# Patient Record
Sex: FEMALE | Race: WHITE | NOT HISPANIC OR LATINO | Employment: OTHER | URBAN - METROPOLITAN AREA
[De-identification: names, ages, dates, MRNs, and addresses within clinical notes are randomized per-mention and may not be internally consistent; named-entity substitution may affect disease eponyms.]

---

## 2023-01-11 ENCOUNTER — OFFICE VISIT (OUTPATIENT)
Dept: MEDICAL GROUP | Facility: PHYSICIAN GROUP | Age: 73
End: 2023-01-11
Payer: MEDICARE

## 2023-01-11 VITALS
OXYGEN SATURATION: 96 % | WEIGHT: 126 LBS | BODY MASS INDEX: 19.78 KG/M2 | TEMPERATURE: 98.5 F | HEART RATE: 70 BPM | HEIGHT: 67 IN | DIASTOLIC BLOOD PRESSURE: 84 MMHG | RESPIRATION RATE: 14 BRPM | SYSTOLIC BLOOD PRESSURE: 126 MMHG

## 2023-01-11 DIAGNOSIS — Z13.1 DIABETES MELLITUS SCREENING: ICD-10-CM

## 2023-01-11 DIAGNOSIS — E55.9 VITAMIN D DEFICIENCY: ICD-10-CM

## 2023-01-11 DIAGNOSIS — E78.5 DYSLIPIDEMIA: ICD-10-CM

## 2023-01-11 DIAGNOSIS — F43.9 STRESS: ICD-10-CM

## 2023-01-11 DIAGNOSIS — Z00.00 HEALTH CARE MAINTENANCE: ICD-10-CM

## 2023-01-11 DIAGNOSIS — H91.93 HEARING DIFFICULTY OF BOTH EARS: ICD-10-CM

## 2023-01-11 DIAGNOSIS — I10 PRIMARY HYPERTENSION: ICD-10-CM

## 2023-01-11 DIAGNOSIS — Z13.29 SCREENING FOR THYROID DISORDER: ICD-10-CM

## 2023-01-11 DIAGNOSIS — Z13.21 ENCOUNTER FOR VITAMIN DEFICIENCY SCREENING: ICD-10-CM

## 2023-01-11 PROCEDURE — 99204 OFFICE O/P NEW MOD 45 MIN: CPT | Performed by: INTERNAL MEDICINE

## 2023-01-11 RX ORDER — AMLODIPINE BESYLATE 5 MG/1
TABLET ORAL
COMMUNITY
End: 2024-03-07 | Stop reason: SDUPTHER

## 2023-01-11 RX ORDER — OLMESARTAN MEDOXOMIL 20 MG/1
20 TABLET ORAL DAILY
COMMUNITY
End: 2024-03-07 | Stop reason: SDUPTHER

## 2023-01-11 SDOH — ECONOMIC STABILITY: HOUSING INSECURITY: IN THE LAST 12 MONTHS, HOW MANY PLACES HAVE YOU LIVED?: 1

## 2023-01-11 SDOH — ECONOMIC STABILITY: HOUSING INSECURITY
IN THE LAST 12 MONTHS, WAS THERE A TIME WHEN YOU DID NOT HAVE A STEADY PLACE TO SLEEP OR SLEPT IN A SHELTER (INCLUDING NOW)?: YES

## 2023-01-11 SDOH — ECONOMIC STABILITY: FOOD INSECURITY: WITHIN THE PAST 12 MONTHS, THE FOOD YOU BOUGHT JUST DIDN'T LAST AND YOU DIDN'T HAVE MONEY TO GET MORE.: NEVER TRUE

## 2023-01-11 SDOH — ECONOMIC STABILITY: TRANSPORTATION INSECURITY
IN THE PAST 12 MONTHS, HAS THE LACK OF TRANSPORTATION KEPT YOU FROM MEDICAL APPOINTMENTS OR FROM GETTING MEDICATIONS?: NO

## 2023-01-11 SDOH — ECONOMIC STABILITY: FOOD INSECURITY: WITHIN THE PAST 12 MONTHS, YOU WORRIED THAT YOUR FOOD WOULD RUN OUT BEFORE YOU GOT MONEY TO BUY MORE.: NEVER TRUE

## 2023-01-11 SDOH — HEALTH STABILITY: MENTAL HEALTH
STRESS IS WHEN SOMEONE FEELS TENSE, NERVOUS, ANXIOUS, OR CAN'T SLEEP AT NIGHT BECAUSE THEIR MIND IS TROUBLED. HOW STRESSED ARE YOU?: TO SOME EXTENT

## 2023-01-11 SDOH — ECONOMIC STABILITY: TRANSPORTATION INSECURITY
IN THE PAST 12 MONTHS, HAS LACK OF RELIABLE TRANSPORTATION KEPT YOU FROM MEDICAL APPOINTMENTS, MEETINGS, WORK OR FROM GETTING THINGS NEEDED FOR DAILY LIVING?: NO

## 2023-01-11 SDOH — ECONOMIC STABILITY: TRANSPORTATION INSECURITY
IN THE PAST 12 MONTHS, HAS LACK OF TRANSPORTATION KEPT YOU FROM MEETINGS, WORK, OR FROM GETTING THINGS NEEDED FOR DAILY LIVING?: NO

## 2023-01-11 SDOH — HEALTH STABILITY: PHYSICAL HEALTH: ON AVERAGE, HOW MANY MINUTES DO YOU ENGAGE IN EXERCISE AT THIS LEVEL?: 30 MIN

## 2023-01-11 SDOH — HEALTH STABILITY: PHYSICAL HEALTH: ON AVERAGE, HOW MANY DAYS PER WEEK DO YOU ENGAGE IN MODERATE TO STRENUOUS EXERCISE (LIKE A BRISK WALK)?: 5 DAYS

## 2023-01-11 SDOH — ECONOMIC STABILITY: INCOME INSECURITY: HOW HARD IS IT FOR YOU TO PAY FOR THE VERY BASICS LIKE FOOD, HOUSING, MEDICAL CARE, AND HEATING?: NOT VERY HARD

## 2023-01-11 SDOH — ECONOMIC STABILITY: INCOME INSECURITY: IN THE LAST 12 MONTHS, WAS THERE A TIME WHEN YOU WERE NOT ABLE TO PAY THE MORTGAGE OR RENT ON TIME?: NO

## 2023-01-11 SDOH — ECONOMIC STABILITY: HOUSING INSECURITY
IN THE LAST 12 MONTHS, WAS THERE A TIME WHEN YOU DID NOT HAVE A STEADY PLACE TO SLEEP OR SLEPT IN A SHELTER (INCLUDING NOW)?: NO

## 2023-01-11 ASSESSMENT — SOCIAL DETERMINANTS OF HEALTH (SDOH)
HOW MANY DRINKS CONTAINING ALCOHOL DO YOU HAVE ON A TYPICAL DAY WHEN YOU ARE DRINKING: 1 OR 2
WITHIN THE PAST 12 MONTHS, YOU WORRIED THAT YOUR FOOD WOULD RUN OUT BEFORE YOU GOT THE MONEY TO BUY MORE: NEVER TRUE
HOW OFTEN DO YOU ATTEND CHURCH OR RELIGIOUS SERVICES?: PATIENT DECLINED
HOW OFTEN DO YOU ATTENT MEETINGS OF THE CLUB OR ORGANIZATION YOU BELONG TO?: PATIENT DECLINED
HOW OFTEN DO YOU ATTEND CHURCH OR RELIGIOUS SERVICES?: PATIENT DECLINED
DO YOU BELONG TO ANY CLUBS OR ORGANIZATIONS SUCH AS CHURCH GROUPS UNIONS, FRATERNAL OR ATHLETIC GROUPS, OR SCHOOL GROUPS?: NO
HOW OFTEN DO YOU GET TOGETHER WITH FRIENDS OR RELATIVES?: ONCE A WEEK
HOW OFTEN DO YOU ATTENT MEETINGS OF THE CLUB OR ORGANIZATION YOU BELONG TO?: PATIENT DECLINED
HOW OFTEN DO YOU HAVE SIX OR MORE DRINKS ON ONE OCCASION: NEVER
HOW HARD IS IT FOR YOU TO PAY FOR THE VERY BASICS LIKE FOOD, HOUSING, MEDICAL CARE, AND HEATING?: NOT VERY HARD
IN A TYPICAL WEEK, HOW MANY TIMES DO YOU TALK ON THE PHONE WITH FAMILY, FRIENDS, OR NEIGHBORS?: MORE THAN THREE TIMES A WEEK
HOW OFTEN DO YOU GET TOGETHER WITH FRIENDS OR RELATIVES?: ONCE A WEEK
DO YOU BELONG TO ANY CLUBS OR ORGANIZATIONS SUCH AS CHURCH GROUPS UNIONS, FRATERNAL OR ATHLETIC GROUPS, OR SCHOOL GROUPS?: NO
IN A TYPICAL WEEK, HOW MANY TIMES DO YOU TALK ON THE PHONE WITH FAMILY, FRIENDS, OR NEIGHBORS?: MORE THAN THREE TIMES A WEEK
HOW OFTEN DO YOU HAVE A DRINK CONTAINING ALCOHOL: 2-4 TIMES A MONTH

## 2023-01-11 ASSESSMENT — LIFESTYLE VARIABLES
SKIP TO QUESTIONS 9-10: 1
AUDIT-C TOTAL SCORE: 2
HOW MANY STANDARD DRINKS CONTAINING ALCOHOL DO YOU HAVE ON A TYPICAL DAY: 1 OR 2
HOW OFTEN DO YOU HAVE A DRINK CONTAINING ALCOHOL: 2-4 TIMES A MONTH
HOW OFTEN DO YOU HAVE SIX OR MORE DRINKS ON ONE OCCASION: NEVER

## 2023-01-11 ASSESSMENT — PATIENT HEALTH QUESTIONNAIRE - PHQ9: CLINICAL INTERPRETATION OF PHQ2 SCORE: 0

## 2023-01-11 NOTE — PROGRESS NOTES
New Patient to establish    Chief Complaint   Patient presents with    Establish Care    Hearing Problem       Subjective:     History of Present Illness: Patient is a 72 y.o. female who is here today to establish primary care    Current Outpatient Medications on File Prior to Visit   Medication Sig Dispense Refill    Ascorbic Acid (VITAMIN C PO) Take  by mouth.      Zinc Acetate, Oral, (ZINC ACETATE PO) Take  by mouth every day.      MAGNESIUM OXIDE PO Take 800 mg by mouth every day.      GINKGO BILOBA EXTRACT PO Take  by mouth.      GLUCOSAMINE-FISH OIL-EPA-DHA PO Take 2,000 mg by mouth every day.      Cholecalciferol (VITAMIN D3 PO) Take  by mouth.      amLODIPine (NORVASC) 5 MG Tab       olmesartan (BENICAR) 20 MG Tab Take 20 mg by mouth every day.       No current facility-administered medications on file prior to visit.     Allergies   Allergen Reactions    Hydrochlorothiazide Rash    Propranolol Unspecified     Pt unsure of reaction     Patient Active Problem List    Diagnosis Date Noted    Hearing difficulty of both ears 01/11/2023    Dyslipidemia 01/11/2023    Vitamin D deficiency 01/11/2023    Hypertension 09/08/2014     No past medical history on file.  No past surgical history on file.  No family history on file.       ROS:  All other systems reviewed and are negative except as stated in the HPI       Physical Exam:     There were no vitals taken for this visit.  General: Normal appearing. No distress.  Pulmonary: Clear to ausculation.  Normal effort.   Cardiovascular: Regular rate and rhythm    Assessment and Plan:     1. Hearing difficulty of both ears  Associated with ringing, patient has a ENT doctor as well, she has hearing aids as well, reported this might be genetic    2. Dyslipidemia  She would like to evaluate her cholesterol panel, she is not on any statin, denied having cardiovascular disease besides high blood pressure, denies smoking or alcohol or substance use  - Lipid Profile; Future    3.  Vitamin D deficiency  - VITAMIN D,25 HYDROXY (DEFICIENCY); Future, she is taking olmesartan 20 mg daily    8. Stress  - Comp Metabolic Panel; Future    9. Primary hypertension  Well-controlled, she is taking Benicar 20 mg daily, as well as amlodipine 5 mg daily, she is checking blood pressures at home with normal range, she would like to discontinue amlodipine, advised to check blood pressure at home and follow-up with me, denied having symptoms related  - Comp Metabolic Panel; Future    -discussion in details on target blood pressure goal, advised monitoring BP closely at home.  Have BP log to present at follow-up visit or send through my chart.   -Advised low-salt diet, healthy dietary option include plenty of vegetable, reduce refine carbohydrates and sugar, regular exercise as tolerated, healthy fat/protein/carbs, also avoid alcohol, no NSAIDs  If symptoms worsen or persist patient can return to clinic for reevaluation.  Red flags and strict emergency room precautions discussed.  Discussed side effects of medication. Patient understand      > Patient also taking BiEST, as well as other hormones including testosterone and DHEA from other office    Follow Up:      Return in about 4 weeks (around 2/8/2023) for labs.    Please note that this dictation was created using voice recognition software. I have made every reasonable attempt to correct obvious errors, but I expect that there are errors of grammar and possibly content that I did not discover before finalizing the note.    Signed by: Jose Garcia M.D.

## 2023-04-04 ENCOUNTER — HOSPITAL ENCOUNTER (OUTPATIENT)
Dept: LAB | Facility: MEDICAL CENTER | Age: 73
End: 2023-04-04
Attending: INTERNAL MEDICINE
Payer: MEDICARE

## 2023-04-04 DIAGNOSIS — E78.5 DYSLIPIDEMIA: ICD-10-CM

## 2023-04-04 DIAGNOSIS — I10 PRIMARY HYPERTENSION: ICD-10-CM

## 2023-04-04 DIAGNOSIS — E55.9 VITAMIN D DEFICIENCY: ICD-10-CM

## 2023-04-04 DIAGNOSIS — F43.9 STRESS: ICD-10-CM

## 2023-04-04 LAB
25(OH)D3 SERPL-MCNC: 57 NG/ML (ref 30–100)
ALBUMIN SERPL BCP-MCNC: 4.6 G/DL (ref 3.2–4.9)
ALBUMIN/GLOB SERPL: 1.8 G/DL
ALP SERPL-CCNC: 76 U/L (ref 30–99)
ALT SERPL-CCNC: 16 U/L (ref 2–50)
ANION GAP SERPL CALC-SCNC: 11 MMOL/L (ref 7–16)
AST SERPL-CCNC: 23 U/L (ref 12–45)
BILIRUB SERPL-MCNC: 0.5 MG/DL (ref 0.1–1.5)
BUN SERPL-MCNC: 18 MG/DL (ref 8–22)
CALCIUM ALBUM COR SERPL-MCNC: 9.2 MG/DL (ref 8.5–10.5)
CALCIUM SERPL-MCNC: 9.7 MG/DL (ref 8.5–10.5)
CHLORIDE SERPL-SCNC: 99 MMOL/L (ref 96–112)
CHOLEST SERPL-MCNC: 177 MG/DL (ref 100–199)
CO2 SERPL-SCNC: 23 MMOL/L (ref 20–33)
CREAT SERPL-MCNC: 0.66 MG/DL (ref 0.5–1.4)
FASTING STATUS PATIENT QL REPORTED: NORMAL
GFR SERPLBLD CREATININE-BSD FMLA CKD-EPI: 93 ML/MIN/1.73 M 2
GLOBULIN SER CALC-MCNC: 2.6 G/DL (ref 1.9–3.5)
GLUCOSE SERPL-MCNC: 93 MG/DL (ref 65–99)
HDLC SERPL-MCNC: 72 MG/DL
LDLC SERPL CALC-MCNC: 95 MG/DL
POTASSIUM SERPL-SCNC: 4.5 MMOL/L (ref 3.6–5.5)
PROT SERPL-MCNC: 7.2 G/DL (ref 6–8.2)
SODIUM SERPL-SCNC: 133 MMOL/L (ref 135–145)
TRIGL SERPL-MCNC: 51 MG/DL (ref 0–149)

## 2023-04-04 PROCEDURE — 80061 LIPID PANEL: CPT

## 2023-04-04 PROCEDURE — 82306 VITAMIN D 25 HYDROXY: CPT

## 2023-04-04 PROCEDURE — 80053 COMPREHEN METABOLIC PANEL: CPT

## 2023-04-04 PROCEDURE — 36415 COLL VENOUS BLD VENIPUNCTURE: CPT

## 2023-04-06 ENCOUNTER — OFFICE VISIT (OUTPATIENT)
Dept: MEDICAL GROUP | Facility: PHYSICIAN GROUP | Age: 73
End: 2023-04-06
Payer: MEDICARE

## 2023-04-06 ENCOUNTER — PHARMACY VISIT (OUTPATIENT)
Dept: PHARMACY | Facility: MEDICAL CENTER | Age: 73
End: 2023-04-06
Payer: COMMERCIAL

## 2023-04-06 VITALS
BODY MASS INDEX: 19.93 KG/M2 | HEIGHT: 67 IN | RESPIRATION RATE: 14 BRPM | OXYGEN SATURATION: 98 % | WEIGHT: 127 LBS | TEMPERATURE: 99 F | HEART RATE: 70 BPM

## 2023-04-06 DIAGNOSIS — I10 PRIMARY HYPERTENSION: ICD-10-CM

## 2023-04-06 DIAGNOSIS — Z78.0 POSTMENOPAUSAL: ICD-10-CM

## 2023-04-06 DIAGNOSIS — M81.0 AGE-RELATED OSTEOPOROSIS WITHOUT CURRENT PATHOLOGICAL FRACTURE: ICD-10-CM

## 2023-04-06 PROCEDURE — 99214 OFFICE O/P EST MOD 30 MIN: CPT | Performed by: INTERNAL MEDICINE

## 2023-04-06 PROCEDURE — RXMED WILLOW AMBULATORY MEDICATION CHARGE: Performed by: INTERNAL MEDICINE

## 2023-04-06 NOTE — PROGRESS NOTES
"Established Patient    Chief Complaint   Patient presents with    Follow-Up       Subjective:     HPI:   Beth presents today with the following.    Patient Active Problem List    Diagnosis Date Noted    Age related osteoporosis 04/06/2023    Hearing difficulty of both ears 01/11/2023    Dyslipidemia 01/11/2023    Vitamin D deficiency 01/11/2023    Hypertension 09/08/2014       Current Outpatient Medications on File Prior to Visit   Medication Sig Dispense Refill    Ascorbic Acid (VITAMIN C PO) Take  by mouth.      Zinc Acetate, Oral, (ZINC ACETATE PO) Take  by mouth every day.      MAGNESIUM OXIDE PO Take 800 mg by mouth every day.      GINKGO BILOBA EXTRACT PO Take  by mouth.      GLUCOSAMINE-FISH OIL-EPA-DHA PO Take 2,000 mg by mouth every day.      Cholecalciferol (VITAMIN D3 PO) Take  by mouth.      amLODIPine (NORVASC) 5 MG Tab       olmesartan (BENICAR) 20 MG Tab Take 20 mg by mouth every day.       No current facility-administered medications on file prior to visit.       Allergies, past medical history, past surgical history, family history, social history reviewed and updated    ROS:  All other systems reviewed and are negative except as stated in the HPI       Physical Exam:     Pulse 70   Temp 37.2 °C (99 °F) (Temporal)   Resp 14   Ht 1.702 m (5' 7\")   Wt 57.6 kg (127 lb)   SpO2 98%   BMI 19.89 kg/m²   General: Normal appearing. No distress.  Pulmonary: Clear to ausculation.  Normal effort.   Cardiovascular: Regular rate and rhythm    Assessment and Plan:     72 y.o. female with the following issues.    1. Age-related osteoporosis without current pathological fracture  Patient had bone scan 2022 with severe osteoporosis, she is trying to do more conservative management with weight lifting, exercise, eating healthier option, vitamin D, calcium and other supplement, she is also taking hormonal therapy with restriction from her other provider, she is not interested in bisphosphonate or other therapy " for osteoporosis, deny having any new fracture, she would like to have another bone scan to compare with the one last year.  Patient educated in detail regarding conservative management as well  - DS-BONE DENSITY STUDY (DEXA); Future    2. Primary hypertension  Patient check blood pressure at home, maybe once a week or so, reported her numbers are in the 160s over 80s to 90s, today blood pressure is in 140s over 80s, her machine number is higher than our blood pressure cuff here in the office.    She is taking amlodipine 5 mg daily, Benicar 20 mg daily, denies symptoms related, she would like to check her numbers more oftenly at home, and follow-up with me regarding blood pressure management, if patient machine is off consistently, continue 24-hour blood pressure monitor and adjust her medication based on that as well    -discussion in details on target blood pressure goal, advised monitoring BP closely at home.  Have BP log to present at follow-up visit or send through my chart.   -Advised low-salt diet, healthy dietary option include plenty of vegetable, reduce refine carbohydrates and sugar, regular exercise as tolerated, healthy fat/protein/carbs, also avoid alcohol, no NSAIDs  If symptoms worsen or persist patient can return to clinic for reevaluation.  Red flags and strict emergency room precautions discussed.  Discussed side effects of medication. Patient understand    3. Postmenopausal  - DS-BONE DENSITY STUDY (DEXA); Future    Please note that this dictation was created using voice recognition software. I have made every reasonable attempt to correct obvious errors, but I expect that there are errors of grammar and possibly content that I did not discover before finalizing the note.    Signed by: Jose Garcia M.D.

## 2023-06-14 ENCOUNTER — PATIENT MESSAGE (OUTPATIENT)
Dept: HEALTH INFORMATION MANAGEMENT | Facility: OTHER | Age: 73
End: 2023-06-14

## 2023-06-14 ENCOUNTER — DOCUMENTATION (OUTPATIENT)
Dept: HEALTH INFORMATION MANAGEMENT | Facility: OTHER | Age: 73
End: 2023-06-14
Payer: MEDICARE

## 2023-08-01 ENCOUNTER — TELEPHONE (OUTPATIENT)
Dept: HEALTH INFORMATION MANAGEMENT | Facility: OTHER | Age: 73
End: 2023-08-01
Payer: MEDICARE

## 2023-10-25 ENCOUNTER — PHARMACY VISIT (OUTPATIENT)
Dept: PHARMACY | Facility: MEDICAL CENTER | Age: 73
End: 2023-10-25
Payer: COMMERCIAL

## 2023-10-25 PROCEDURE — RXMED WILLOW AMBULATORY MEDICATION CHARGE: Performed by: INTERNAL MEDICINE

## 2023-10-25 RX ORDER — ZOSTER VACCINE RECOMBINANT, ADJUVANTED 50 MCG/0.5
KIT INTRAMUSCULAR
Qty: 0.5 ML | Refills: 0 | Status: SHIPPED | OUTPATIENT
Start: 2023-10-25 | End: 2024-03-07

## 2024-03-06 SDOH — ECONOMIC STABILITY: FOOD INSECURITY: WITHIN THE PAST 12 MONTHS, YOU WORRIED THAT YOUR FOOD WOULD RUN OUT BEFORE YOU GOT MONEY TO BUY MORE.: NEVER TRUE

## 2024-03-06 SDOH — HEALTH STABILITY: PHYSICAL HEALTH: ON AVERAGE, HOW MANY DAYS PER WEEK DO YOU ENGAGE IN MODERATE TO STRENUOUS EXERCISE (LIKE A BRISK WALK)?: 3 DAYS

## 2024-03-06 SDOH — ECONOMIC STABILITY: INCOME INSECURITY: IN THE LAST 12 MONTHS, WAS THERE A TIME WHEN YOU WERE NOT ABLE TO PAY THE MORTGAGE OR RENT ON TIME?: NO

## 2024-03-06 SDOH — ECONOMIC STABILITY: INCOME INSECURITY: HOW HARD IS IT FOR YOU TO PAY FOR THE VERY BASICS LIKE FOOD, HOUSING, MEDICAL CARE, AND HEATING?: NOT VERY HARD

## 2024-03-06 SDOH — HEALTH STABILITY: MENTAL HEALTH
STRESS IS WHEN SOMEONE FEELS TENSE, NERVOUS, ANXIOUS, OR CAN'T SLEEP AT NIGHT BECAUSE THEIR MIND IS TROUBLED. HOW STRESSED ARE YOU?: RATHER MUCH

## 2024-03-06 SDOH — ECONOMIC STABILITY: HOUSING INSECURITY: IN THE LAST 12 MONTHS, HOW MANY PLACES HAVE YOU LIVED?: 1

## 2024-03-06 SDOH — HEALTH STABILITY: PHYSICAL HEALTH: ON AVERAGE, HOW MANY MINUTES DO YOU ENGAGE IN EXERCISE AT THIS LEVEL?: 40 MIN

## 2024-03-06 SDOH — ECONOMIC STABILITY: FOOD INSECURITY: WITHIN THE PAST 12 MONTHS, THE FOOD YOU BOUGHT JUST DIDN'T LAST AND YOU DIDN'T HAVE MONEY TO GET MORE.: NEVER TRUE

## 2024-03-06 ASSESSMENT — LIFESTYLE VARIABLES
SKIP TO QUESTIONS 9-10: 1
HOW OFTEN DO YOU HAVE SIX OR MORE DRINKS ON ONE OCCASION: NEVER
AUDIT-C TOTAL SCORE: 2
HOW MANY STANDARD DRINKS CONTAINING ALCOHOL DO YOU HAVE ON A TYPICAL DAY: 1 OR 2
HOW OFTEN DO YOU HAVE A DRINK CONTAINING ALCOHOL: 2-4 TIMES A MONTH

## 2024-03-06 ASSESSMENT — SOCIAL DETERMINANTS OF HEALTH (SDOH)
HOW HARD IS IT FOR YOU TO PAY FOR THE VERY BASICS LIKE FOOD, HOUSING, MEDICAL CARE, AND HEATING?: NOT VERY HARD
HOW MANY DRINKS CONTAINING ALCOHOL DO YOU HAVE ON A TYPICAL DAY WHEN YOU ARE DRINKING: 1 OR 2
HOW OFTEN DO YOU HAVE A DRINK CONTAINING ALCOHOL: 2-4 TIMES A MONTH
IN A TYPICAL WEEK, HOW MANY TIMES DO YOU TALK ON THE PHONE WITH FAMILY, FRIENDS, OR NEIGHBORS?: THREE TIMES A WEEK
WITHIN THE PAST 12 MONTHS, YOU WORRIED THAT YOUR FOOD WOULD RUN OUT BEFORE YOU GOT THE MONEY TO BUY MORE: NEVER TRUE
DO YOU BELONG TO ANY CLUBS OR ORGANIZATIONS SUCH AS CHURCH GROUPS UNIONS, FRATERNAL OR ATHLETIC GROUPS, OR SCHOOL GROUPS?: YES
HOW OFTEN DO YOU ATTEND CHURCH OR RELIGIOUS SERVICES?: NEVER
HOW OFTEN DO YOU HAVE SIX OR MORE DRINKS ON ONE OCCASION: NEVER
HOW OFTEN DO YOU GET TOGETHER WITH FRIENDS OR RELATIVES?: ONCE A WEEK
HOW OFTEN DO YOU GET TOGETHER WITH FRIENDS OR RELATIVES?: ONCE A WEEK
HOW OFTEN DO YOU ATTENT MEETINGS OF THE CLUB OR ORGANIZATION YOU BELONG TO?: 1 TO 4 TIMES PER YEAR
HOW OFTEN DO YOU ATTEND CHURCH OR RELIGIOUS SERVICES?: NEVER
HOW OFTEN DO YOU ATTENT MEETINGS OF THE CLUB OR ORGANIZATION YOU BELONG TO?: 1 TO 4 TIMES PER YEAR
DO YOU BELONG TO ANY CLUBS OR ORGANIZATIONS SUCH AS CHURCH GROUPS UNIONS, FRATERNAL OR ATHLETIC GROUPS, OR SCHOOL GROUPS?: YES
IN A TYPICAL WEEK, HOW MANY TIMES DO YOU TALK ON THE PHONE WITH FAMILY, FRIENDS, OR NEIGHBORS?: THREE TIMES A WEEK

## 2024-03-07 ENCOUNTER — OFFICE VISIT (OUTPATIENT)
Dept: MEDICAL GROUP | Facility: MEDICAL CENTER | Age: 74
End: 2024-03-07
Payer: MEDICARE

## 2024-03-07 VITALS
DIASTOLIC BLOOD PRESSURE: 84 MMHG | HEART RATE: 70 BPM | RESPIRATION RATE: 16 BRPM | BODY MASS INDEX: 19.78 KG/M2 | SYSTOLIC BLOOD PRESSURE: 148 MMHG | HEIGHT: 67 IN | WEIGHT: 126 LBS | OXYGEN SATURATION: 98 % | TEMPERATURE: 98 F

## 2024-03-07 DIAGNOSIS — H93.13 TINNITUS AURIUM, BILATERAL: ICD-10-CM

## 2024-03-07 DIAGNOSIS — Z79.890 HORMONE REPLACEMENT THERAPY (HRT): ICD-10-CM

## 2024-03-07 DIAGNOSIS — I10 PRIMARY HYPERTENSION: ICD-10-CM

## 2024-03-07 DIAGNOSIS — M81.0 AGE-RELATED OSTEOPOROSIS WITHOUT CURRENT PATHOLOGICAL FRACTURE: ICD-10-CM

## 2024-03-07 DIAGNOSIS — Z12.31 ENCOUNTER FOR SCREENING MAMMOGRAM FOR MALIGNANT NEOPLASM OF BREAST: ICD-10-CM

## 2024-03-07 DIAGNOSIS — N81.10 PROLAPSE OF FEMALE BLADDER, ACQUIRED: ICD-10-CM

## 2024-03-07 DIAGNOSIS — Z13.21 ENCOUNTER FOR VITAMIN DEFICIENCY SCREENING: ICD-10-CM

## 2024-03-07 DIAGNOSIS — Z82.49 FAMILY HISTORY OF HEART DISEASE: ICD-10-CM

## 2024-03-07 PROCEDURE — 3077F SYST BP >= 140 MM HG: CPT | Performed by: FAMILY MEDICINE

## 2024-03-07 PROCEDURE — 99214 OFFICE O/P EST MOD 30 MIN: CPT | Performed by: FAMILY MEDICINE

## 2024-03-07 PROCEDURE — 3079F DIAST BP 80-89 MM HG: CPT | Performed by: FAMILY MEDICINE

## 2024-03-07 RX ORDER — AMLODIPINE BESYLATE 5 MG/1
5 TABLET ORAL DAILY
Qty: 90 TABLET | Refills: 3 | Status: SHIPPED | OUTPATIENT
Start: 2024-03-07

## 2024-03-07 RX ORDER — OLMESARTAN MEDOXOMIL 20 MG/1
20 TABLET ORAL DAILY
Qty: 90 TABLET | Refills: 3 | Status: SHIPPED | OUTPATIENT
Start: 2024-03-07

## 2024-03-07 ASSESSMENT — PATIENT HEALTH QUESTIONNAIRE - PHQ9: CLINICAL INTERPRETATION OF PHQ2 SCORE: 0

## 2024-03-07 NOTE — PROGRESS NOTES
"Verbal consent was acquired by the patient to use [employer] ambient listening note generation during this visit     Subjective:     CC: \"establish care, lab work and referrals\"      History of Present Illness  The patient is a 73-year-old female who is here to establish care and manage medications among other things.    She is basically very healthy and feels terrific. She does not check her blood pressure at home because it gives her high blood pressure. Normally, it is in the low 130s over 85. Her doctor has been prescribing her olmesartan and amlodipine. Her toes tend to go a little numb from the tips when she has high blood pressure. It is worse when she does not sleep well, which was last night. She eats healthy. She denies any swelling in her legs.    She has osteoporosis and is working very hard with weights. She requests a referral for a bone density test. She has not used it because she wants to continue working with weights and see if she can improve her density.    She spoke with Violetta about a prolapsed bladder referral and would like to have it checked out. It has been about a year since she had it repaired. It is annoying, and she feels like her bladder is falling out. It is uncomfortable. She denies any leaky urine. It happens when she sneezes or coughs. She can feel it inside. She has not had any imaging for this. She has 2 children, which were vaginal deliveries. She denies any trauma with them. She denies any other surgeries in her pelvic area. She takes Biest, progesterone, and testosterone cream. She denies any hot flashes. Her annual hormone tests show high cortisol, which she has had for 10 years. She is working on that, which may contribute to the high blood pressure and difficulty sleeping at times.    She has severe tinnitus and hearing loss. One is worse than the other. She has hearing aids, but she still has a hard time hearing people. She is in touch with her audiologist fairly " regularly and is going to set up another appointment to see if she can have them adjusted, but that does not address the tinnitus at all.    She has a root canal and needs to see a dentist, but they are so expensive. She requests a CBC test. Her sodium levels are always very low, but her electrolytes are balanced. She had her colonoscopy done. She does not get influenza or pneumonia vaccines every year.   Her mother had a prolapsed bladder. Her father had heart disease.      Current Outpatient Medications   Medication Sig Refill Last Dispense    amLODIPine (NORVASC) 5 MG Tab Take 1 Tablet by mouth every day. 3 Unknown (outside pharmacy)    Ascorbic Acid (VITAMIN C PO) Take  by mouth.  Unknown (patient-reported)    Cholecalciferol (VITAMIN D3 PO) Take  by mouth.  Unknown (patient-reported)    GLUCOSAMINE-FISH OIL-EPA-DHA PO Take 2,000 mg by mouth every day.  Unknown (patient-reported)    MAGNESIUM OXIDE PO Take 800 mg by mouth every day.  Unknown (patient-reported)    olmesartan (BENICAR) 20 MG Tab Take 1 Tablet by mouth every day. 3 Unknown (outside pharmacy)    Zinc Acetate, Oral, (ZINC ACETATE PO) Take  by mouth every day.  Unknown (patient-reported)      Allergies   Allergen Reactions    Hydrochlorothiazide Rash    Propranolol Unspecified     Pt unsure of reaction      History reviewed. No pertinent past medical history.     History reviewed. No pertinent surgical history.     History reviewed. No pertinent family history.     Social History     Socioeconomic History    Marital status:     Highest education level: 12th grade   Tobacco Use    Smoking status: Never    Smokeless tobacco: Never   Vaping Use    Vaping Use: Never used   Substance and Sexual Activity    Alcohol use: Yes     Comment: very little, almost none    Drug use: Never     Social Determinants of Health     Financial Resource Strain: Low Risk  (3/6/2024)    Overall Financial Resource Strain (CARDIA)     Difficulty of Paying Living Expenses:  "Not very hard   Food Insecurity: No Food Insecurity (3/6/2024)    Hunger Vital Sign     Worried About Running Out of Food in the Last Year: Never true     Ran Out of Food in the Last Year: Never true   Transportation Needs: No Transportation Needs (3/6/2024)    PRAPARE - Transportation     Lack of Transportation (Medical): No     Lack of Transportation (Non-Medical): No   Physical Activity: Insufficiently Active (3/6/2024)    Exercise Vital Sign     Days of Exercise per Week: 3 days     Minutes of Exercise per Session: 40 min   Stress: Stress Concern Present (3/6/2024)    [employer] - Occupational Stress Questionnaire     Feeling of Stress : Rather much   Social Connections: Moderately Integrated (3/6/2024)    Social Connection and Isolation Panel [NHANES]     Frequency of Communication with Friends and Family: Three times a week     Frequency of Social Gatherings with Friends and Family: Once a week     Attends [employer] Services: Never     Active Member of Clubs or Organizations: Yes     Attends Club or Organization Meetings: 1 to 4 times per year     Marital Status:    Housing Stability: Low Risk  (3/6/2024)    Housing Stability Vital Sign     Unable to Pay for Housing in the Last Year: No     Number of Places Lived in the Last Year: 1     Unstable Housing in the Last Year: No          Objective:     Exam:  BP (!) 148/84   Pulse 70   Temp 36.7 °C (98 °F) (Temporal)   Resp 16   Ht 1.702 m (5' 7\")   Wt 57.2 kg (126 lb)   SpO2 98%   Breastfeeding No   BMI 19.73 kg/m²  Body mass index is 19.73 kg/m².    Physical Exam  Vitals reviewed.   Constitutional:       General: She is not in acute distress.     Appearance: Normal appearance.   HENT:      Head: Normocephalic and atraumatic.   Cardiovascular:      Rate and Rhythm: Normal rate and regular rhythm.      Heart sounds: Normal heart sounds.   Pulmonary:      Effort: Pulmonary effort is normal. No respiratory distress.      " Breath sounds: Normal breath sounds.   Skin:     General: Skin is warm and dry.   Neurological:      Mental Status: She is alert. Mental status is at baseline.      Gait: Gait normal.   Psychiatric:         Mood and Affect: Mood normal.         Behavior: Behavior normal.           Results  Laboratory Studies  Labs from 04/2023 and 04/04/2024 were reviewed with the patient.      Assessment & Plan:       1. Primary hypertension  - amLODIPine (NORVASC) 5 MG Tab; Take 1 Tablet by mouth every day.  Dispense: 90 Tablet; Refill: 3  - olmesartan (BENICAR) 20 MG Tab; Take 1 Tablet by mouth every day.  Dispense: 90 Tablet; Refill: 3  - Comp Metabolic Panel; Future  - MICROALBUMIN CREAT RATIO URINE; Future  - CRP HIGH SENSITIVE (CARDIAC); Future    2. Age-related osteoporosis without current pathological fracture  - DS-BONE DENSITY STUDY (DEXA); Future  - VITAMIN D,25 HYDROXY (DEFICIENCY); Future    3. Prolapse of female bladder, acquired  - Referral to Urogynecology  - US-PELVIC COMPLETE (TRANSABDOMINAL/TRANSVAGINAL) (COMBO); Future    4. Encounter for screening mammogram for malignant neoplasm of breast  - MA-SCREENING MAMMO BILAT W/TOMOSYNTHESIS W/CAD; Future    5. Hormone replacement therapy (HRT)  - US-PELVIC COMPLETE (TRANSABDOMINAL/TRANSVAGINAL) (COMBO); Future  - CBC WITH DIFFERENTIAL; Future    6. Tinnitus aurium, bilateral  - CBC WITH DIFFERENTIAL; Future    7. Family history of heart disease  - Lipid Profile; Future  - CRP HIGH SENSITIVE (CARDIAC); Future    8. Encounter for vitamin deficiency screening  - CBC WITH DIFFERENTIAL; Future  - VITAMIN D,25 HYDROXY (DEFICIENCY); Future      Assessment & Plan  1. Osteoporosis.  She will schedule a bone density test at her convenience.    2. Prolapsed bladder.  We discussed that the more children she has, it seems to raise her risk. I will get a pelvic ultrasound. I will refer her to urogynecology.    3. Hormone replacement therapy.  I will order a mammogram.    4. Tinnitus  and hearing loss.  I do not see any overt infection. I will refer her to ENT.    5. Hyponatremia.  Her sodium level is barely low at 130. Her other electrolytes and kidney function look fine.    6. Hypertension.  She will continue to monitor her blood pressure at home. She will bring her home blood pressure cuff to compare it at her next visit. If her blood pressure keeps staying high, we might need to adjust the dose.    7. Health maintenance.  She declines influenza and pneumonia vaccines. I will check CBC, yearly cholesterol, and metabolic panel.    Follow-up  I will recheck her blood pressure.         Return in about 1 year (around 3/7/2025), or if symptoms worsen or fail to improve, for Annual Medicare.      This note was created using voice recognition software (Dragon). The accuracy of the dictation is limited by the abilities of the software. I have reviewed the note prior to signing, however some errors in grammar and context are still possible. If you have any questions related to this note please do not hesitate to contact our office.

## 2024-03-07 NOTE — LETTER
Onyu  Edgardo Vega D.O.  [address]  [place] [address]  Fax: [phone]   Authorization for Release/Disclosure of   Protected Health Information   Name: ISRAEL ALBARADO : 1950 SSN: [id number]   Address: [address] [place] [address] Phone:    There are no phone numbers on file.   I authorize the entity listed below to release/disclose the PHI below to:   Onyu/Edgardo Vega D.O. and Edgardo Vega D.O.   Provider or Entity Name:  [facility]   Address   City, [place], Zip:               [address], [place] [address]   Phone:  [phone]      Fax:      [phone]        Reason for request: continuity of care   Information to be released:    [ X ] LAST COLONOSCOPY,  including any PATH REPORT and follow-up  [ X ] LAST FIT/COLOGUARD RESULT [  ] LAST DEXA  [  ] LAST MAMMOGRAM  [  ] LAST PAP  [  ] LAST LABS [  ] RETINA EXAM REPORT  [  ] IMMUNIZATION RECORDS  [  ] Release all info      [  ] Check here and initial the line next to each item to release ALL health information INCLUDING  _____ Care and treatment for drug and / or alcohol abuse  _____ HIV testing, infection status, or AIDS  _____ Genetic Testing  DATES OF SERVICE OR TIME PERIOD TO BE DISCLOSED: _____________  I understand and acknowledge that:  * This Authorization may be revoked at any time by you in writing, except if your health information has already been used or disclosed.  * Your health information that will be used or disclosed as a result of you signing this authorization could be re-disclosed by the recipient. If this occurs, your re-disclosed health information may no longer be protected by State or Federal laws.  * You may refuse to sign this Authorization. Your refusal will not affect your ability to obtain treatment.  * This Authorization becomes effective upon signing and will  on (date) __________.      If no date is indicated, this Authorization will   one (1) year from the signature date.    Name: Beth Maddox    Signature:   Date:     3/7/2024       PLEASE FAX REQUESTED RECORDS BACK TO: [phone]

## 2024-03-14 ENCOUNTER — HOSPITAL ENCOUNTER (OUTPATIENT)
Dept: LAB | Facility: MEDICAL CENTER | Age: 74
End: 2024-03-14
Attending: FAMILY MEDICINE
Payer: MEDICARE

## 2024-03-14 DIAGNOSIS — Z79.890 HORMONE REPLACEMENT THERAPY (HRT): ICD-10-CM

## 2024-03-14 DIAGNOSIS — H93.13 TINNITUS AURIUM, BILATERAL: ICD-10-CM

## 2024-03-14 DIAGNOSIS — I10 PRIMARY HYPERTENSION: ICD-10-CM

## 2024-03-14 DIAGNOSIS — M81.0 AGE-RELATED OSTEOPOROSIS WITHOUT CURRENT PATHOLOGICAL FRACTURE: ICD-10-CM

## 2024-03-14 DIAGNOSIS — Z13.21 ENCOUNTER FOR VITAMIN DEFICIENCY SCREENING: ICD-10-CM

## 2024-03-14 DIAGNOSIS — Z82.49 FAMILY HISTORY OF HEART DISEASE: ICD-10-CM

## 2024-03-14 LAB
25(OH)D3 SERPL-MCNC: 55 NG/ML (ref 30–100)
ALBUMIN SERPL BCP-MCNC: 4.4 G/DL (ref 3.2–4.9)
ALBUMIN/GLOB SERPL: 1.6 G/DL
ALP SERPL-CCNC: 71 U/L (ref 30–99)
ALT SERPL-CCNC: 20 U/L (ref 2–50)
ANION GAP SERPL CALC-SCNC: 9 MMOL/L (ref 7–16)
AST SERPL-CCNC: 33 U/L (ref 12–45)
BASOPHILS # BLD AUTO: 0.2 % (ref 0–1.8)
BASOPHILS # BLD: 0.01 K/UL (ref 0–0.12)
BILIRUB SERPL-MCNC: 0.6 MG/DL (ref 0.1–1.5)
BUN SERPL-MCNC: 14 MG/DL (ref 8–22)
CALCIUM ALBUM COR SERPL-MCNC: 9.2 MG/DL (ref 8.5–10.5)
CALCIUM SERPL-MCNC: 9.5 MG/DL (ref 8.5–10.5)
CHLORIDE SERPL-SCNC: 99 MMOL/L (ref 96–112)
CHOLEST SERPL-MCNC: 174 MG/DL (ref 100–199)
CO2 SERPL-SCNC: 24 MMOL/L (ref 20–33)
CREAT SERPL-MCNC: 0.54 MG/DL (ref 0.5–1.4)
CREAT UR-MCNC: 21.96 MG/DL
CRP SERPL HS-MCNC: 0.3 MG/L (ref 0–3)
EOSINOPHIL # BLD AUTO: 0.04 K/UL (ref 0–0.51)
EOSINOPHIL NFR BLD: 0.8 % (ref 0–6.9)
ERYTHROCYTE [DISTWIDTH] IN BLOOD BY AUTOMATED COUNT: 43.9 FL (ref 35.9–50)
GFR SERPLBLD CREATININE-BSD FMLA CKD-EPI: 97 ML/MIN/1.73 M 2
GLOBULIN SER CALC-MCNC: 2.7 G/DL (ref 1.9–3.5)
GLUCOSE SERPL-MCNC: 86 MG/DL (ref 65–99)
HCT VFR BLD AUTO: 43.6 % (ref 37–47)
HDLC SERPL-MCNC: 66 MG/DL
HGB BLD-MCNC: 14.8 G/DL (ref 12–16)
IMM GRANULOCYTES # BLD AUTO: 0.01 K/UL (ref 0–0.11)
IMM GRANULOCYTES NFR BLD AUTO: 0.2 % (ref 0–0.9)
LDLC SERPL CALC-MCNC: 98 MG/DL
LYMPHOCYTES # BLD AUTO: 2.43 K/UL (ref 1–4.8)
LYMPHOCYTES NFR BLD: 45.9 % (ref 22–41)
MCH RBC QN AUTO: 31 PG (ref 27–33)
MCHC RBC AUTO-ENTMCNC: 33.9 G/DL (ref 32.2–35.5)
MCV RBC AUTO: 91.4 FL (ref 81.4–97.8)
MICROALBUMIN UR-MCNC: <1.2 MG/DL
MICROALBUMIN/CREAT UR: NORMAL MG/G (ref 0–30)
MONOCYTES # BLD AUTO: 0.47 K/UL (ref 0–0.85)
MONOCYTES NFR BLD AUTO: 8.9 % (ref 0–13.4)
NEUTROPHILS # BLD AUTO: 2.33 K/UL (ref 1.82–7.42)
NEUTROPHILS NFR BLD: 44 % (ref 44–72)
NRBC # BLD AUTO: 0 K/UL
NRBC BLD-RTO: 0 /100 WBC (ref 0–0.2)
PLATELET # BLD AUTO: 269 K/UL (ref 164–446)
PMV BLD AUTO: 9.1 FL (ref 9–12.9)
POTASSIUM SERPL-SCNC: 4.5 MMOL/L (ref 3.6–5.5)
PROT SERPL-MCNC: 7.1 G/DL (ref 6–8.2)
RBC # BLD AUTO: 4.77 M/UL (ref 4.2–5.4)
SODIUM SERPL-SCNC: 132 MMOL/L (ref 135–145)
TRIGL SERPL-MCNC: 49 MG/DL (ref 0–149)
WBC # BLD AUTO: 5.3 K/UL (ref 4.8–10.8)

## 2024-03-14 PROCEDURE — 82570 ASSAY OF URINE CREATININE: CPT

## 2024-03-14 PROCEDURE — 85025 COMPLETE CBC W/AUTO DIFF WBC: CPT

## 2024-03-14 PROCEDURE — 82306 VITAMIN D 25 HYDROXY: CPT

## 2024-03-14 PROCEDURE — 36415 COLL VENOUS BLD VENIPUNCTURE: CPT

## 2024-03-14 PROCEDURE — 86141 C-REACTIVE PROTEIN HS: CPT

## 2024-03-14 PROCEDURE — 80053 COMPREHEN METABOLIC PANEL: CPT

## 2024-03-14 PROCEDURE — 80061 LIPID PANEL: CPT

## 2024-03-14 PROCEDURE — 82043 UR ALBUMIN QUANTITATIVE: CPT

## 2024-04-18 ENCOUNTER — APPOINTMENT (OUTPATIENT)
Dept: RADIOLOGY | Facility: MEDICAL CENTER | Age: 74
End: 2024-04-18
Attending: FAMILY MEDICINE
Payer: MEDICARE

## 2024-05-10 ENCOUNTER — HOSPITAL ENCOUNTER (OUTPATIENT)
Dept: RADIOLOGY | Facility: MEDICAL CENTER | Age: 74
End: 2024-05-10
Attending: FAMILY MEDICINE
Payer: MEDICARE

## 2024-05-10 ENCOUNTER — TELEPHONE (OUTPATIENT)
Dept: MEDICAL GROUP | Facility: MEDICAL CENTER | Age: 74
End: 2024-05-10
Payer: MEDICARE

## 2024-05-10 DIAGNOSIS — N81.10 PROLAPSE OF FEMALE BLADDER, ACQUIRED: ICD-10-CM

## 2024-05-10 DIAGNOSIS — Z79.890 HORMONE REPLACEMENT THERAPY (HRT): ICD-10-CM

## 2024-05-10 NOTE — TELEPHONE ENCOUNTER
Brief message that is following up on her ultrasound today.  She has a referral to gynecology important that she follows up with them given thickened endometrial stripe.

## 2024-06-18 ENCOUNTER — OFFICE VISIT (OUTPATIENT)
Dept: MEDICAL GROUP | Facility: MEDICAL CENTER | Age: 74
End: 2024-06-18
Payer: MEDICARE

## 2024-06-18 VITALS
SYSTOLIC BLOOD PRESSURE: 118 MMHG | WEIGHT: 130 LBS | HEIGHT: 67 IN | BODY MASS INDEX: 20.4 KG/M2 | HEART RATE: 89 BPM | TEMPERATURE: 99.1 F | DIASTOLIC BLOOD PRESSURE: 80 MMHG | OXYGEN SATURATION: 97 %

## 2024-06-18 DIAGNOSIS — N81.10 PROLAPSE OF FEMALE BLADDER, ACQUIRED: ICD-10-CM

## 2024-06-18 DIAGNOSIS — H91.93 HEARING DIFFICULTY OF BOTH EARS: ICD-10-CM

## 2024-06-18 DIAGNOSIS — I10 PRIMARY HYPERTENSION: ICD-10-CM

## 2024-06-18 DIAGNOSIS — M81.0 AGE-RELATED OSTEOPOROSIS WITHOUT CURRENT PATHOLOGICAL FRACTURE: ICD-10-CM

## 2024-06-18 DIAGNOSIS — E78.5 DYSLIPIDEMIA: ICD-10-CM

## 2024-06-18 DIAGNOSIS — Z00.00 ENCOUNTER FOR MEDICARE ANNUAL WELLNESS EXAM: ICD-10-CM

## 2024-06-18 DIAGNOSIS — H93.13 TINNITUS OF BOTH EARS: ICD-10-CM

## 2024-06-18 DIAGNOSIS — E87.1 HYPONATREMIA: ICD-10-CM

## 2024-06-18 PROCEDURE — G0439 PPPS, SUBSEQ VISIT: HCPCS | Performed by: FAMILY MEDICINE

## 2024-06-18 RX ORDER — OLMESARTAN MEDOXOMIL 40 MG/1
40 TABLET ORAL DAILY
Qty: 90 TABLET | Refills: 3 | Status: SHIPPED | OUTPATIENT
Start: 2024-06-18

## 2024-06-18 SDOH — HEALTH STABILITY: PHYSICAL HEALTH: ON AVERAGE, HOW MANY DAYS PER WEEK DO YOU ENGAGE IN MODERATE TO STRENUOUS EXERCISE (LIKE A BRISK WALK)?: 4 DAYS

## 2024-06-18 SDOH — ECONOMIC STABILITY: INCOME INSECURITY: HOW HARD IS IT FOR YOU TO PAY FOR THE VERY BASICS LIKE FOOD, HOUSING, MEDICAL CARE, AND HEATING?: NOT VERY HARD

## 2024-06-18 SDOH — ECONOMIC STABILITY: HOUSING INSECURITY: IN THE LAST 12 MONTHS, HOW MANY PLACES HAVE YOU LIVED?: 1

## 2024-06-18 SDOH — ECONOMIC STABILITY: FOOD INSECURITY: WITHIN THE PAST 12 MONTHS, THE FOOD YOU BOUGHT JUST DIDN'T LAST AND YOU DIDN'T HAVE MONEY TO GET MORE.: NEVER TRUE

## 2024-06-18 SDOH — ECONOMIC STABILITY: FOOD INSECURITY: WITHIN THE PAST 12 MONTHS, YOU WORRIED THAT YOUR FOOD WOULD RUN OUT BEFORE YOU GOT MONEY TO BUY MORE.: NEVER TRUE

## 2024-06-18 SDOH — ECONOMIC STABILITY: INCOME INSECURITY: IN THE LAST 12 MONTHS, WAS THERE A TIME WHEN YOU WERE NOT ABLE TO PAY THE MORTGAGE OR RENT ON TIME?: NO

## 2024-06-18 SDOH — HEALTH STABILITY: PHYSICAL HEALTH: ON AVERAGE, HOW MANY MINUTES DO YOU ENGAGE IN EXERCISE AT THIS LEVEL?: 40 MIN

## 2024-06-18 ASSESSMENT — SOCIAL DETERMINANTS OF HEALTH (SDOH)
HOW OFTEN DO YOU HAVE A DRINK CONTAINING ALCOHOL: 2-4 TIMES A MONTH
IN A TYPICAL WEEK, HOW MANY TIMES DO YOU TALK ON THE PHONE WITH FAMILY, FRIENDS, OR NEIGHBORS?: MORE THAN THREE TIMES A WEEK
DO YOU BELONG TO ANY CLUBS OR ORGANIZATIONS SUCH AS CHURCH GROUPS UNIONS, FRATERNAL OR ATHLETIC GROUPS, OR SCHOOL GROUPS?: NO
HOW HARD IS IT FOR YOU TO PAY FOR THE VERY BASICS LIKE FOOD, HOUSING, MEDICAL CARE, AND HEATING?: NOT VERY HARD
WITHIN THE PAST 12 MONTHS, YOU WORRIED THAT YOUR FOOD WOULD RUN OUT BEFORE YOU GOT THE MONEY TO BUY MORE: NEVER TRUE
HOW OFTEN DO YOU HAVE SIX OR MORE DRINKS ON ONE OCCASION: NEVER
IN A TYPICAL WEEK, HOW MANY TIMES DO YOU TALK ON THE PHONE WITH FAMILY, FRIENDS, OR NEIGHBORS?: MORE THAN THREE TIMES A WEEK
DO YOU BELONG TO ANY CLUBS OR ORGANIZATIONS SUCH AS CHURCH GROUPS UNIONS, FRATERNAL OR ATHLETIC GROUPS, OR SCHOOL GROUPS?: NO
IN THE PAST 12 MONTHS, HAS THE ELECTRIC, GAS, OIL, OR WATER COMPANY THREATENED TO SHUT OFF SERVICE IN YOUR HOME?: NO
HOW OFTEN DO YOU GET TOGETHER WITH FRIENDS OR RELATIVES?: TWICE A WEEK
HOW MANY DRINKS CONTAINING ALCOHOL DO YOU HAVE ON A TYPICAL DAY WHEN YOU ARE DRINKING: 1 OR 2
HOW OFTEN DO YOU ATTENT MEETINGS OF THE CLUB OR ORGANIZATION YOU BELONG TO?: PATIENT DECLINED
HOW OFTEN DO YOU ATTENT MEETINGS OF THE CLUB OR ORGANIZATION YOU BELONG TO?: PATIENT DECLINED
HOW OFTEN DO YOU GET TOGETHER WITH FRIENDS OR RELATIVES?: TWICE A WEEK

## 2024-06-18 ASSESSMENT — ENCOUNTER SYMPTOMS: GENERAL WELL-BEING: GOOD

## 2024-06-18 ASSESSMENT — PATIENT HEALTH QUESTIONNAIRE - PHQ9: CLINICAL INTERPRETATION OF PHQ2 SCORE: 0

## 2024-06-18 ASSESSMENT — ACTIVITIES OF DAILY LIVING (ADL): BATHING_REQUIRES_ASSISTANCE: 0

## 2024-06-18 ASSESSMENT — LIFESTYLE VARIABLES
HOW MANY STANDARD DRINKS CONTAINING ALCOHOL DO YOU HAVE ON A TYPICAL DAY: 1 OR 2
AUDIT-C TOTAL SCORE: 2
HOW OFTEN DO YOU HAVE SIX OR MORE DRINKS ON ONE OCCASION: NEVER
HOW OFTEN DO YOU HAVE A DRINK CONTAINING ALCOHOL: 2-4 TIMES A MONTH
SKIP TO QUESTIONS 9-10: 1

## 2024-06-18 ASSESSMENT — FIBROSIS 4 INDEX: FIB4 SCORE: 1.98

## 2024-06-18 NOTE — PROGRESS NOTES
Chief Complaint   Patient presents with    Annual Wellness Visit       HPI:  Beth Maddox is a 73 y.o. here for Medicare Annual Wellness Visit     History of Present Illness  The patient presents for annual wellness.    The patient underwent blood work in 03/2023, during which her sodium levels were found to be significantly low. She has made dietary modifications, including reducing her water intake and incorporating more salt into her diet. She is currently on Olmesartan for hypertension management, having doubled her dosage as per my recommendation. She has recently initiated a nitric oxide lozenge, which she reports as beneficial in relaxing the endothelial lining. Despite this, she denies experiencing headaches, chest pain, or heartburn. She also consumes beet juice daily, which she believes has significantly reduced her blood pressure. She has a history of hypertension since the age of 61.    The patient reports poor hearing, significant tinnitus, and difficulty with ear clearing and pressure. She has undergone root canals and suspects a potential ear canal infection. She is under the care of an audiologist and uses hearing aids. A year ago, she was referred to an ENT specialist for a growth in her ear, which she attributes to a cold water swimmer. However, she forgot to inquire about the elevation issue and persistent ear clearing issues.    The patient was referred to Dr. Ohara for a prolapsed bladder, but has not received a return call from his . She is currently taking bioidentical hormones, estrogen and testosterone. She underwent a colonoscopy in 2022 and is on a 5-year follow-up. She is awaiting a repeat bone density scan and has been engaging in weight training. She maintains a healthy diet. She denies experiencing urinary incontinence, depression, recent falls, and feels safe at home.       Patient Active Problem List    Diagnosis Date Noted    Prolapse of female bladder, acquired  03/07/2024    Age related osteoporosis 04/06/2023    Hearing difficulty of both ears 01/11/2023    Dyslipidemia 01/11/2023    Vitamin D deficiency 01/11/2023    Hypertension 09/08/2014       Current Outpatient Medications   Medication Sig Dispense Refill    olmesartan (BENICAR) 40 MG Tab Take 1 Tablet by mouth every day. 90 Tablet 3    amLODIPine (NORVASC) 5 MG Tab Take 1 Tablet by mouth every day. 90 Tablet 3    Ascorbic Acid (VITAMIN C PO) Take  by mouth.      Zinc Acetate, Oral, (ZINC ACETATE PO) Take  by mouth every day.      MAGNESIUM OXIDE PO Take 800 mg by mouth every day.      GLUCOSAMINE-FISH OIL-EPA-DHA PO Take 2,000 mg by mouth every day.      Cholecalciferol (VITAMIN D3 PO) Take  by mouth.       No current facility-administered medications for this visit.          Current supplements as per medication list.     Allergies: Hydrochlorothiazide and Propranolol    Current social contact/activities:   Quite active physically and socially     She  reports that she has never smoked. She has never used smokeless tobacco. She reports current alcohol use. She reports that she does not use drugs.  Counseling given: Not Answered      ROS:    Gait: Uses no assistive device  Ostomy: No  Other tubes: No  Amputations: No  Chronic oxygen use: No  Last eye exam: 15 years ago   Wears hearing aids: Yes   : Denies any urinary leakage during the last 6 months    Screening:    Depression Screening  Little interest or pleasure in doing things?  0 - not at all  Feeling down, depressed , or hopeless? 0 - not at all  Patient Health Questionnaire Score: 0     If depressive symptoms identified deferred to follow up visit unless specifically addressed in assessment and plan.    Interpretation of PHQ-9 Total Score   Score Severity   1-4 No Depression   5-9 Mild Depression   10-14 Moderate Depression   15-19 Moderately Severe Depression   20-27 Severe Depression    Screening for Cognitive Impairment  Do you or any of your friends or  family members have any concern about your memory? No  Three Minute Recall (Leader, Season, Table) 3/3    Carroll clock face with all 12 numbers and set the hands to show 10 minutes after 11.  Yes    Cognitive concerns identified deferred for follow up unless specifically addressed in assessment and plan.    Fall Risk Assessment  Has the patient had two or more falls in the last year or any fall with injury in the last year?  No    Safety Assessment  Do you always wear your seatbelt?  Yes  Any changes to home needed to function safely? No  Difficulty hearing.  Yes  Patient counseled about all safety risks that were identified.    Functional Assessment ADLs  Are there any barriers preventing you from cooking for yourself or meeting nutritional needs?  No.    Are there any barriers preventing you from driving safely or obtaining transportation?  No.    Are there any barriers preventing you from using a telephone or calling for help?  No    Are there any barriers preventing you from shopping?  No.    Are there any barriers preventing you from taking care of your own finances?  No    Are there any barriers preventing you from managing your medications?  No    Are there any barriers preventing you from showering, bathing or dressing yourself? No    Are there any barriers preventing you from doing housework or laundry? No  Are there any barriers preventing you from using the toilet?No  Are you currently engaging in any exercise or physical activity?  Yes. Pt lifted weight for 30 minutes and uses the stair master for 20 minutes. Pt has a at home gym       Self-Assessment of Health  What is your perception of your health? Good  Do you sleep more than six hours a night? Yes  In the past 7 days, how much did pain keep you from doing your normal work? None  Do you spend quality time with family or friends (virtually or in person)? Yes  Do you usually eat a heart healthy diet that constists of a variety of fruits, vegetables,  whole grains and fiber? Yes  Do you eat foods high in fat and/or Fast Food more than three times per week? No    Advance Care Planning  Do you have an Advance Directive, Living Will, Durable Power of , or POLST? No                 Health Maintenance Summary            Overdue - COVID-19 Vaccine (4 - 2023-24 season) Overdue since 9/1/2023 01/20/2022  Imm Admin: MODERNA SARS-COV-2 VACCINE (12+)    04/30/2021  Imm Admin: MODERNA SARS-COV-2 VACCINE (12+)    03/26/2021  Imm Admin: MODERNA SARS-COV-2 VACCINE (12+)              Ordered - Mammogram (Every 2 Years) Ordered on 3/7/2024      05/05/2022  MA-SCREENING MAMMO BILAT W/TOMOSYNTHESIS W/CAD              Postponed - Influenza Vaccine (Season Ended) Postponed until 3/7/2025      No completion history exists for this topic.              Postponed - IMM DTaP/Tdap/Td Vaccine (1 - Tdap) Postponed until 3/7/2025      No completion history exists for this topic.              Postponed - Pneumococcal Vaccine: 65+ Years (1 of 1 - PCV) Postponed until 3/7/2025      No completion history exists for this topic.              Annual Wellness Visit (Yearly) Next due on 6/18/2025 06/18/2024  Visit Dx: Encounter for Medicare annual wellness exam    03/17/2022                Ordered - Bone Density Scan (Every 5 Years) Ordered on 3/7/2024      05/05/2022  DS-BONE DENSITY STUDY (DEXA)              Colorectal Cancer Screening (Colonoscopy - Every 5 Years) Next due on 8/29/2027 08/29/2022  AMB EXTERNAL COLONOSCOPY RESULTS              Hepatitis C Screening  Completed      04/04/2022  Hepatitis C Antibody component of HEP C VIRUS ANTIBODY              Zoster (Shingles) Vaccines (Series Information) Completed      10/25/2023  Imm Admin: Zoster Vaccine Recombinant (RZV) (SHINGRIX)    04/06/2023  Imm Admin: Zoster Vaccine Recombinant (RZV) (SHINGRIX)              Hepatitis A Vaccine (Hep A) (Series Information) Aged Out      No completion history exists for this topic.  "             Hepatitis B Vaccine (Hep B) (Series Information) Aged Out      No completion history exists for this topic.              HPV Vaccines (Series Information) Aged Out      No completion history exists for this topic.              Polio Vaccine (Inactivated Polio) (Series Information) Aged Out      No completion history exists for this topic.              Meningococcal Immunization (Series Information) Aged Out      No completion history exists for this topic.                    Patient Care Team:  Edgardo Vega D.O. as PCP - General (Family Medicine)        Social History     Tobacco Use    Smoking status: Never    Smokeless tobacco: Never   Vaping Use    Vaping status: Never Used   Substance Use Topics    Alcohol use: Yes     Comment: very little, almost none    Drug use: Never     History reviewed. No pertinent family history.  She  has no past medical history on file.   History reviewed. No pertinent surgical history.    Exam:   /80   Pulse 89   Temp 37.3 °C (99.1 °F) (Temporal)   Ht 1.702 m (5' 7\")   Wt 59 kg (130 lb)   SpO2 97%  Body mass index is 20.36 kg/m².    Hearing fair.    Dentition good  Alert, oriented in no acute distress.  Eye contact is good, speech goal directed, affect calm    Results  Laboratory Studies  Sodium level was 132. Microalbumin to creatinine ratio was normal. GFR was 97. Creatinine was 0.54. Potassium was 4.5. Total cholesterol was 174, LDL was 98, HDL was 66. Vitamin D was 55. CRP was low risk. Hemoglobin was 14.8. White blood cells were 5.3.       Assessment and Plan. The following treatment and monitoring plan is recommended:      1. Encounter for Medicare annual wellness exam  - Comp Metabolic Panel; Future  - Lipid Profile; Future  - MICROALBUMIN CREAT RATIO URINE; Future  - CRP QUANTITIVE (NON-CARDIAC); Future    2. Hyponatremia    3. Primary hypertension  - olmesartan (BENICAR) 40 MG Tab; Take 1 Tablet by mouth every day.  Dispense: 90 Tablet; Refill: " 3  - Comp Metabolic Panel; Future  - MICROALBUMIN CREAT RATIO URINE; Future  - CRP QUANTITIVE (NON-CARDIAC); Future    4. Hearing difficulty of both ears  - Referral to ENT    5. Prolapse of female bladder, acquired    6. Tinnitus of both ears  - Referral to ENT    7. Dyslipidemia  - Lipid Profile; Future  - CRP QUANTITIVE (NON-CARDIAC); Future    8. Age-related osteoporosis without current pathological fracture  - Comp Metabolic Panel; Future    Assessment & Plan  1. Annual wellness visit.  Age-appropriate guidance and counseling provided Caryl declined we discussed vaccines, we discussed cancer screening, DEXA scan, exercise, diet, remaining social, fall prevention.    The patient's cholesterol levels are stable. Her vitamin D level is 55. Her CRP is low risk. Hemoglobin is 14.8. White blood cell count is 5.3. Her last bone density scan was in 2022, indicating osteoporosis with a T-score of -4.2. A mammogram will be scheduled. A stool test will be ordered. Blood work will be ordered.    2. Hyponatremia.  The patient's sodium levels have increased from 133 a year ago to 132, with the lower end of the normal range being between 135 and 145. This is not alarming. The patient was advised to maintain adequate hydration.    3. Hypertension.  The patient's blood pressure is well-regulated. A prescription for Olmesartan 40 mg tablets will be provided.    4. Hearing loss.  An ENT referral will be made.    5. Prolapsed bladder.  A consultation with a urogynecologist has been arranged.           Services suggested: No services needed at this time  Health Care Screening: Age-appropriate preventive services recommended by USPTF and ACIP covered by Medicare were discussed today. Services ordered if indicated and agreed upon by the patient.  Referrals offered: Community-based lifestyle interventions to reduce health risks and promote self-management and wellness, fall prevention, nutrition, physical activity, tobacco-use  cessation, weight loss, and mental health services as per orders if indicated.    Discussion today about general wellness and lifestyle habits:    Prevent falls and reduce trip hazards; Cautioned about securing or removing rugs.  Have a working fire alarm and carbon monoxide detector;   Engage in regular physical activity and social activities     Follow-up: Return in about 1 year (around 6/18/2025), or if symptoms worsen or fail to improve, for Annual Medicare.

## 2024-11-08 NOTE — PROGRESS NOTES
Urogynecology & Reconstructive Pelvic Surgery - Consultation Visit    Beth Maddox MRN:[id number] :1950    Referred by: Dr. Edgardo Vega ([facility])    Reason for Visit:   Chief Complaint   Patient presents with    New Patient     Ref for prolapse bladder         Subjective     History of Presenting Illness:  Beth Maddox is a 74 y.o. P2 HTN, HLD who presents for the evaluation and management of pelvic organ prolapse.     Reports feeling like her bladder is falling out, which is very uncomfortable. Ongoing for 1.5 years now and worsening. Initially occurred with exercise but now all the time. Did not have a repair as was stated in records.     Patient has been using support underwear for prolapse which has helped. She tried pelvic therapy at home but was not consistent with exercises.    Previously had issues with stress incontinence primarily due to constipation but now improving. The patient reports some leakage with certain movements (bending, squatting). She wears one pad a day. The patient has used dietary modifications to help with her constipation.    Currently using Bi-Est on skin but not vaginally. She is using progesterone and testosterone daily. Also using DHEA cream every 4 days.    Denies any UTI symptoms today.     Prior Pelvic surgery:   None     Prior treatment:   Pelvic floor therapy- at home, not formal    Fluid intake:   48 oz water    Pelvic floor symptom review:     Bladder:   Voids per day: 5-6 Voids per night: 2     Urinary incontinence episodes per day: Daily    Urge leakage:  Full Bladder   Stress leakage: With Bending/Squatting   Continuous / insensible urine loss: No    Nocturnal enuresis: No    Leakage volume: Drops   Number of pads/day: 1    Bladder emptying: Complete   Voiding symptoms: Hesitancy and Strain-Push to Void   UTI in last 12 months: None   Other urologic history: None      Prolapse:     Prolapse symptoms: Bulge and Pelvic Pressure   Degree of prolapse:  To Introitus   Duration of prolapse symptoms: 1.5 years      Bowel:    Constipation: No    Bowel movements per day: 1 , stool quality: soft log   Straining to empty bowels: No    Splinting to evacuate: No    Painful evacuation: No    Difficulty emptying rectum: No    Incontinence to stool: No    Blood in stool: No    Hemorrhoids: Yes   Bowel conditions: None   Most recent colonoscopy: , 5 year return      Sexual function:    Sexually active: Yes   Gender of partners: Male   Pain with intercourse: No   History of abuse: No        Pelvic Pain: None    Past medical and surgical history    Past obstetric history   Number of vaginal deliveries: 2   Number of  deliveries: 0   History of vacuum/forceps: No    History of obstetric anal sphincter injury: No     Past gynecological history:    Last menstrual period/Menopause: No LMP recorded. Patient is postmenopausal.   History of abnormal uterine bleeding: No    History of fibroids: No    History of endometrial polyps:  No    History of endometriosis: No    History of cervical dysplasia: No    Last pap: Last one more than 10 years ago   Current contraception: Post menopausal      Past medical history:  Past Medical History:   Diagnosis Date    Hypertension      Past surgical history:No past surgical history on file.  Medications:has a current medication list which includes the following prescription(s): amlodipine, ascorbic acid, zinc acetate (oral), magnesium oxide, glucosamine-fish oil-epa-dha, cholecalciferol, and olmesartan.  Allergies:Hydrochlorothiazide and Propranolol  Family history:  Family History   Problem Relation Age of Onset    Osteoporosis Mother     Cancer Father     Heart Disease Sister         a-fib    Heart Disease Brother      Social history: reports that she has never smoked. She has never used smokeless tobacco. She reports current alcohol use. She reports that she does not use drugs.    Review of systems: A full review of systems was  performed, and negative with the exception of want is noted above in the HPI.        Objective        BP (!) 140/86 (BP Location: Left arm, Patient Position: Sitting, BP Cuff Size: Adult)   Pulse 83   Wt 132 lb 3.2 oz   BMI 20.71 kg/m²     Physical Exam  Constitutional:       Appearance: Normal appearance. She is normal weight.   HENT:      Head: Normocephalic.      Nose: Nose normal.   Eyes:      Pupils: Pupils are equal, round, and reactive to light.   Pulmonary:      Effort: Pulmonary effort is normal.   Abdominal:      Palpations: Abdomen is soft.   Musculoskeletal:         General: Normal range of motion.      Cervical back: Normal range of motion.   Skin:     General: Skin is warm.   Neurological:      General: No focal deficit present.      Mental Status: She is alert.   Psychiatric:         Mood and Affect: Mood normal.          Genitourinary:    Vulva: WNL   Bulbocavernosus reflex: Intact   Anal wink reflex: Intact   Perineal sensation: WNL   Urethra: WNL   Vagina: Atrophic   Atrophy: Mild   Cough stress test: Positive with Valsalva    Chaperone was present throughout the physical exam.     Pelvic floor:    POP-Q:   Aa +1 Ba +1 C -5   GH 4 PB 1 TVL8   Ap +1 Bp +1 D -6      Prolapse stage: 2   Paravaginal defect: bilateral   Cervical elongation: No    Urethral tenderness: No    Bladder/ suprapubic tenderness: No    Levator tenderness: None   Levator muscle tone: WNL   Pelvic floor contraction strength (modified Oxford scale): 4=Good   Pelvic floor contraction duration: Normal   Bimanual exam: Small, Mobile Uterus   Rectal: deferred   Vaginal band/stricture: No     Procedure Performed: No    Diagnostic test and records review:    Urine dipstick:   Lab Results   Component Value Date/Time    POCCOLOR Yellow 11/11/2024 10:15 AM    POCAPPEAR Cloudy 11/11/2024 10:15 AM    POCLEUKEST Small 11/11/2024 10:15 AM    POCNITRITE Positive 11/11/2024 10:15 AM    POCUROBILIGE 0.2 11/11/2024 10:15 AM    POCPROTEIN  Negative 11/11/2024 10:15 AM    POCURPH 7.0 11/11/2024 10:15 AM    POCBLOOD Negative 11/11/2024 10:15 AM    POCSPGRV 1.015 11/11/2024 10:15 AM    POCKETONES Negative 11/11/2024 10:15 AM    POCBILIRUBIN Negative 11/11/2024 10:15 AM    POCGLUCUA Negative 11/11/2024 10:15 AM         Post-void residual: 10 mL, performed by Bladder Scanner    Labs:   Lab Results   Component Value Date/Time    SODIUM 132 (L) 03/14/2024 0738    POTASSIUM 4.5 03/14/2024 0738    CHLORIDE 99 03/14/2024 0738    CO2 24 03/14/2024 0738    GLUCOSE 86 03/14/2024 0738    BUN 14 03/14/2024 0738    CREATININE 0.54 03/14/2024 0738    CALCIUM 9.5 03/14/2024 0738    ANION 9.0 03/14/2024 0738       Lab Results   Component Value Date/Time    WBC 5.3 03/14/2024 07:38 AM    RBC 4.77 03/14/2024 07:38 AM    HEMOGLOBIN 14.8 03/14/2024 07:38 AM    MCV 91.4 03/14/2024 07:38 AM    MCH 31.0 03/14/2024 07:38 AM    MCHC 33.9 03/14/2024 07:38 AM    RDW 43.9 03/14/2024 07:38 AM    MPV 9.1 03/14/2024 07:38 AM         Radiology:   05/10/24 Pelvic US:   - Ut: AV, 3.13 cm x 6.79 cm x 3.51 cm   - EMT: 1.6mm  - RO: normal size, small calcification  - LO: normal size   - Bladder low lying     Procedural: None    Documentation reviewed: Prior EMR Records    Outside records reviewed: 0 pages      Assessment & Plan     Beth Maddox is a 74 y.o. P2 with Stage 2 POP. We discussed my recommendations for further diagnosis and treatment at length today.     Assessment & Plan  Uterovaginal prolapse, incomplete  Patient has symptomatic pelvic organ prolapse, uterovaginal predominant.  In most cases, this is not a dangerous condition that necessitates treatment in all patients, but treatment is offered when it impacts quality of life, bladder function, or bowel function.  I reviewed the clinical findings and discussed the pathogenesis extensively, including genetic tendency, aging, menopause and childbirth injuries. We discussed options for management, including both  nonsurgical and surgical options.   Nonsurgical options include both expectant management, pelvic floor physical therapy to prevent progression, and pessary use. I discussed different types of pessary as well as pessary care.   We reviewed all surgical options including both vaginal and laparoscopic reconstructive approaches, and those using native tissue (non-mesh) and mesh augmented approaches.  We also discussed uterine-sparing approaches and those which involve hysterectomy. We discussed recurrent/retreatment risk for all surgical approaches.   Patient opts for pelvic floor physical therapy and pessary. Recommend starting with #4 ring with support and knob.   Patient will return for Pessary fitting.  Instructed to start vaginal estrogen now in prep for pessary fitting.     Orders:    POCT Urinalysis    estradiol (ESTRACE VAGINAL) 0.1 MG/GM vaginal cream; Apply 1g cream inside vagina using applicator nightly for 2 weeks, then twice per week thereafter. For refills, continue to take twice per week.    Referral to Physical Therapy    PALOMA (stress urinary incontinence, female)  She reports stress urinary incontinence (PALOMA) symptoms. Cough stress test today was postive. The pathogenesis of PALOMA includes genetic tendency,  aging, menopause and childbirth injuries, and is overall caused by a weakness of the pelvic support of the urethra, and thus interventions to fix this enhance the structure either via behavioral/physical therapy or through surgical intervention.  I discussed options for management which include both nonsurgical and surgical options.   - We discussed non-surgical options including pelvic floor physical therapy and pessary use.  I discussed different types of pessary that may be used for stress urinary incontinence as well as pessary care.    - Procedural interventions aimed to strengthen the urethral support or the urethral opening, and there are various techniques that are tailored to each patient's  symptoms and bladder function  A midurethral sling is the gold standard treatment for most stress urinary incontinence, especially when the urethra is hypermobile/unsupported.  This involves the placement of a safe, light weight piece of mesh under the urethra to help support it back into the normal anatomic location.  This is an outpatient vaginal surgery with same-day discharge and no need for narcotic pain medications.  While not the most painful procedure, there are restrictions on activity for 6 weeks afterwards including vigorous exercise, heavy lifting, straining, intercourse, sitting in water/swimming. Approximately 90% of patients experience of significant improvement in their leakage symptoms after sling procedure, and 60 to 70% report a complete resolution of her urinary leakage. Patients with urgency may also see improvement, but this may also persist or worsen due to different underlying causes of urinary urgency.  Approximately 20-30% of patients may require a temporary Raygoza catheter after this procedure, and 1/50 patients may need an adjustment to loosen the sling in the immediate postoperative period due to overtightening.  Mesh is considered overwhelmingly safe and has been extensively studied, but there is an approximately 1 to 3% chance long-term of a mesh complication, the majority of which are small mesh exposures which can be managed either through vaginal estrogen or excision of mesh.  Major complications are rare.   A urethral bulking procedure using Bulkamid is an alternative therapy for stress urinary incontinence. This does not use any permanent implanted materials, but employed as a safe hydrogel which allows ingrowth of the patient's own tissue to strengthen the opening of the bladder into the urethra. This is also a same-day procedure without any postoperative restrictions. While less invasive, success rates are lower when compared to the sling, with approximately 60% of patients  seeing a dramatic improvement in their symptoms, 90% of patients seeing some improvement. 1/4 patients require 2 treatments in order to get optimal therapeutic results. There is a 10 to 20% chance of needing a temporary Raygoza catheter for 1-2 days after the procedure.  At this time she would like to pursue PFPT and pessary fitting.       Orders:    Referral to Physical Therapy    Genitourinary syndrome of menopause  She has vaginal atrophy / genitorurinary syndrome of menopause. This is very common and due to low estrogen levels, which render the vaginal tissue thin, irritated, and open to colonization with gut camacho. This can lead to irritation, dryness, painful sex, urinary infections and urinary urgency and incontinence. Discussed risks, benefits, and indications for vaginal estrogen therapy.  Vaginal estrogen is considered a topical-only therapy that has negligible absorption into the bloodstream and is not associated with increased risks for uterine or breast cancers, stroke, blood clots. The effects of vaginal estrogen can take weeks to months.  Prescription given for: Estrace   Orders:    estradiol (ESTRACE VAGINAL) 0.1 MG/GM vaginal cream; Apply 1g cream inside vagina using applicator nightly for 2 weeks, then twice per week thereafter. For refills, continue to take twice per week.      Elsie Brasher, PGY3  [facility]    I saw the patient with Dr. Elsie Brasher, Family Medicine resident. I performed a physical exam and medical decision making. I reviewed, verified, and edited the documentation of 11/11/2024 and amended with the content and plan as written by the  resident.      Ailyn Duncan MD  Urogynecology and Reconstructive Pelvic Surgery  Department of Obstetrics and Gynecology  [facility]  [facility]

## 2024-11-11 ENCOUNTER — GYNECOLOGY VISIT (OUTPATIENT)
Dept: GYNECOLOGY | Facility: CLINIC | Age: 74
End: 2024-11-11
Payer: MEDICARE

## 2024-11-11 VITALS
BODY MASS INDEX: 20.71 KG/M2 | WEIGHT: 132.2 LBS | SYSTOLIC BLOOD PRESSURE: 140 MMHG | HEART RATE: 83 BPM | DIASTOLIC BLOOD PRESSURE: 86 MMHG

## 2024-11-11 DIAGNOSIS — N81.6 RECTOCELE: ICD-10-CM

## 2024-11-11 DIAGNOSIS — N95.8 GENITOURINARY SYNDROME OF MENOPAUSE: ICD-10-CM

## 2024-11-11 DIAGNOSIS — N81.2 UTEROVAGINAL PROLAPSE, INCOMPLETE: ICD-10-CM

## 2024-11-11 DIAGNOSIS — N81.10 CYSTOCELE, UNSPECIFIED: ICD-10-CM

## 2024-11-11 DIAGNOSIS — N39.3 SUI (STRESS URINARY INCONTINENCE, FEMALE): ICD-10-CM

## 2024-11-11 LAB
APPEARANCE UR: NORMAL
BILIRUB UR STRIP-MCNC: NEGATIVE MG/DL
COLOR UR AUTO: YELLOW
GLUCOSE UR STRIP.AUTO-MCNC: NEGATIVE MG/DL
KETONES UR STRIP.AUTO-MCNC: NEGATIVE MG/DL
LEUKOCYTE ESTERASE UR QL STRIP.AUTO: NORMAL
NITRITE UR QL STRIP.AUTO: POSITIVE
PH UR STRIP.AUTO: 7 [PH] (ref 5–8)
PROT UR QL STRIP: NEGATIVE MG/DL
RBC UR QL AUTO: NEGATIVE
SP GR UR STRIP.AUTO: 1.01
UROBILINOGEN UR STRIP-MCNC: 0.2 MG/DL

## 2024-11-11 PROCEDURE — 99214 OFFICE O/P EST MOD 30 MIN: CPT | Performed by: STUDENT IN AN ORGANIZED HEALTH CARE EDUCATION/TRAINING PROGRAM

## 2024-11-11 PROCEDURE — 3079F DIAST BP 80-89 MM HG: CPT | Performed by: STUDENT IN AN ORGANIZED HEALTH CARE EDUCATION/TRAINING PROGRAM

## 2024-11-11 PROCEDURE — 3077F SYST BP >= 140 MM HG: CPT | Performed by: STUDENT IN AN ORGANIZED HEALTH CARE EDUCATION/TRAINING PROGRAM

## 2024-11-11 PROCEDURE — 81002 URINALYSIS NONAUTO W/O SCOPE: CPT | Performed by: STUDENT IN AN ORGANIZED HEALTH CARE EDUCATION/TRAINING PROGRAM

## 2024-11-11 RX ORDER — ESTRADIOL 0.1 MG/G
CREAM VAGINAL
Qty: 1 EACH | Refills: 6 | Status: SHIPPED | OUTPATIENT
Start: 2024-11-11

## 2024-11-11 ASSESSMENT — FIBROSIS 4 INDEX: FIB4 SCORE: 2

## 2024-11-11 NOTE — Clinical Note
Hi Dr. Vega,  Thank you for referring Beth for prolapse and urinary incontinence. Attached is my note for your review. Let me know if you have any questions or concerns.  Take care,  Ailyn Duncan MD Female Pelvic Medicine and Reconstructive Surgery Department of Obstetrics and Gynecology [facility]

## 2024-11-11 NOTE — PATIENT INSTRUCTIONS
Preferred [facility] therapists:     [facility]: ([place], Medicare)  Kate Piedra DPT and Blanca Ram DPT  [address], [place] [address]  [phone]  [email or web address]  [facility] ([place] only, $100/1h visit)  Paty Serrato DPT  [address], [address]  [place], [place], [address]  [email or web address]  [facility] (all insurances, for Medicaid spec # of visits and 1x/week)   Jania Ware  [address]    Phone: [phone]   [email or web address]   Riley Cheng PT  Norma Thomas - DPT  [address], [place], [place], [address]  Phone: [phone]  Fax:   [phone]  [facility]: ([place] only)  Brandee Royal DPT  [address] [place] [address] [place] [address]  Phone: [phone]  [email or web address]  [facility]   Nyasia Bui MPT  [address], [place] [address]  Phone: [phone]  [email or web address]  [facility]  Martha Lawson MSPT  [address], [place], [address]  Phone: [phone]  [email or web address]

## 2024-11-11 NOTE — ASSESSMENT & PLAN NOTE
Patient has symptomatic pelvic organ prolapse, uterovaginal predominant.  In most cases, this is not a dangerous condition that necessitates treatment in all patients, but treatment is offered when it impacts quality of life, bladder function, or bowel function.  I reviewed the clinical findings and discussed the pathogenesis extensively, including genetic tendency, aging, menopause and childbirth injuries. We discussed options for management, including both nonsurgical and surgical options.   Nonsurgical options include both expectant management, pelvic floor physical therapy to prevent progression, and pessary use. I discussed different types of pessary as well as pessary care.   We reviewed all surgical options including both vaginal and laparoscopic reconstructive approaches, and those using native tissue (non-mesh) and mesh augmented approaches.  We also discussed uterine-sparing approaches and those which involve hysterectomy. We discussed recurrent/retreatment risk for all surgical approaches.   Patient opts for pelvic floor physical therapy and pessary. Recommend starting with #4 ring with support and knob.   Patient will return for Pessary fitting.  Instructed to start vaginal estrogen now in prep for pessary fitting.     Orders:    POCT Urinalysis    estradiol (ESTRACE VAGINAL) 0.1 MG/GM vaginal cream; Apply 1g cream inside vagina using applicator nightly for 2 weeks, then twice per week thereafter. For refills, continue to take twice per week.    Referral to Physical Therapy

## 2024-11-11 NOTE — PROGRESS NOTES
PT here today ref for prolapse bladder  PT States no other concerns  Hysterectomy: no  Good #: [phone]   PVR : 10mL   Pharmacy Verified

## 2024-11-17 PROCEDURE — 57160 INSERT PESSARY/OTHER DEVICE: CPT | Performed by: STUDENT IN AN ORGANIZED HEALTH CARE EDUCATION/TRAINING PROGRAM

## 2024-11-18 ENCOUNTER — GYNECOLOGY VISIT (OUTPATIENT)
Dept: GYNECOLOGY | Facility: CLINIC | Age: 74
End: 2024-11-18
Payer: MEDICARE

## 2024-11-18 VITALS
BODY MASS INDEX: 21.08 KG/M2 | HEART RATE: 76 BPM | DIASTOLIC BLOOD PRESSURE: 97 MMHG | WEIGHT: 134.6 LBS | SYSTOLIC BLOOD PRESSURE: 152 MMHG

## 2024-11-18 DIAGNOSIS — N81.6 RECTOCELE: ICD-10-CM

## 2024-11-18 DIAGNOSIS — N81.2 UTEROVAGINAL PROLAPSE, INCOMPLETE: ICD-10-CM

## 2024-11-18 DIAGNOSIS — N81.10 CYSTOCELE, UNSPECIFIED: ICD-10-CM

## 2024-11-18 PROCEDURE — A4562 PESSARY, NON RUBBER,ANY TYPE: HCPCS | Performed by: STUDENT IN AN ORGANIZED HEALTH CARE EDUCATION/TRAINING PROGRAM

## 2024-11-18 ASSESSMENT — FIBROSIS 4 INDEX: FIB4 SCORE: 2

## 2024-11-18 NOTE — PROGRESS NOTES
PT here today pessary fitting  PT States no other concerns  Good #: [phone]   Pharmacy Verified

## 2024-11-18 NOTE — PROCEDURES
Pessary Fitting    Date/Time: 11/17/2024 6:59 PM    Performed by: Ailyn Duncan M.D.  Authorized by: Ailyn Duncan M.D.    Indication:     Indication for pessary: uterine prolapse, cystocele and rectocele      POP-Q:  Aa +1 Ba +1 C -5 GH 4 PB 1 TVL8 Ap +1 Bp +1 D -6  Procedure:     Pessaries tried:  #4 incontinence dish with support    Pessary type:  Incontinence dish w/ support    Pessary size:  4  Outcomes:     How did patient test pessary?:  Voided, ambulate, jumping jacks, squats, cough    Patient tolerance of procedure:  Tolerated well, no immediate complications    Post-procedure education provided: yes      Pessary maintenance plan:  FU in 1 month. Instructed to continue vaginal estrogen twice weekly and to call for bleeding, pain, inability to void or pessary falls out.    Reviewed by: provider      Ailyn Duncan MD  Female Pelvic Medicine and Reconstructive Surgery  Department of Obstetrics and Gynecology  [facility]

## 2024-11-20 ENCOUNTER — HOSPITAL ENCOUNTER (OUTPATIENT)
Dept: RADIOLOGY | Facility: MEDICAL CENTER | Age: 74
End: 2024-11-20
Attending: FAMILY MEDICINE
Payer: MEDICARE

## 2024-11-20 DIAGNOSIS — Z12.31 ENCOUNTER FOR SCREENING MAMMOGRAM FOR MALIGNANT NEOPLASM OF BREAST: ICD-10-CM

## 2024-11-20 PROCEDURE — 77067 SCR MAMMO BI INCL CAD: CPT

## 2024-11-22 ENCOUNTER — HOSPITAL ENCOUNTER (OUTPATIENT)
Dept: RADIOLOGY | Facility: MEDICAL CENTER | Age: 74
End: 2024-11-22
Payer: MEDICARE

## 2024-12-23 ENCOUNTER — GYNECOLOGY VISIT (OUTPATIENT)
Dept: GYNECOLOGY | Facility: CLINIC | Age: 74
End: 2024-12-23
Payer: MEDICARE

## 2024-12-23 VITALS
WEIGHT: 134.6 LBS | SYSTOLIC BLOOD PRESSURE: 140 MMHG | BODY MASS INDEX: 21.08 KG/M2 | HEART RATE: 81 BPM | DIASTOLIC BLOOD PRESSURE: 94 MMHG

## 2024-12-23 DIAGNOSIS — N81.2 UTEROVAGINAL PROLAPSE, INCOMPLETE: ICD-10-CM

## 2024-12-23 DIAGNOSIS — N81.10 CYSTOCELE, UNSPECIFIED: ICD-10-CM

## 2024-12-23 DIAGNOSIS — N95.8 GENITOURINARY SYNDROME OF MENOPAUSE: ICD-10-CM

## 2024-12-23 DIAGNOSIS — N39.3 SUI (STRESS URINARY INCONTINENCE, FEMALE): ICD-10-CM

## 2024-12-23 DIAGNOSIS — N81.6 RECTOCELE: ICD-10-CM

## 2024-12-23 PROCEDURE — 99215 OFFICE O/P EST HI 40 MIN: CPT | Mod: 25 | Performed by: STUDENT IN AN ORGANIZED HEALTH CARE EDUCATION/TRAINING PROGRAM

## 2024-12-23 PROCEDURE — 3080F DIAST BP >= 90 MM HG: CPT | Performed by: STUDENT IN AN ORGANIZED HEALTH CARE EDUCATION/TRAINING PROGRAM

## 2024-12-23 PROCEDURE — 3077F SYST BP >= 140 MM HG: CPT | Performed by: STUDENT IN AN ORGANIZED HEALTH CARE EDUCATION/TRAINING PROGRAM

## 2024-12-23 PROCEDURE — A4562 PESSARY, NON RUBBER,ANY TYPE: HCPCS | Performed by: STUDENT IN AN ORGANIZED HEALTH CARE EDUCATION/TRAINING PROGRAM

## 2024-12-23 ASSESSMENT — FIBROSIS 4 INDEX: FIB4 SCORE: 2

## 2024-12-23 NOTE — PROGRESS NOTES
Urogynecology & Reconstructive Pelvic Surgery - Follow Up Visit    Beth Maddox MRN:[id number] :1950    Referred by: Dr. Edgardo Vega ([facility])    Reason for Visit:   Chief Complaint   Patient presents with    Other     FV on pessary         Subjective     History of Presenting Illness:  Beth Maddox is a 74 y.o. P2 with HTN, HLD and S2POP and PALOMA here for pessary check.     Fitted with a #4 incontinence dish with support on 24. Reports feels like she cannot control her urine with walking primarily and will leak. Otherwise from a prolapse standpoint, the pessary is working very well in relieving those symptoms; able to void and have BM without issue. No pain or bleeding.     Using vaginal estrogen without issue. Able to remove and insert the pessary on her own.    Initial symptoms:  Reports feeling like her bladder is falling out, which is very uncomfortable. Ongoing for 1.5 years now and worsening. Initially occurred with exercise but now all the time. Did not have a repair as was stated in records.     Patient has been using support underwear for prolapse which has helped. She tried pelvic therapy at home but was not consistent with exercises.    Previously had issues with stress incontinence primarily due to constipation but now improving. The patient reports some leakage with certain movements (bending, squatting). She wears one pad a day. The patient has used dietary modifications to help with her constipation.    Currently using Bi-Est on skin but not vaginally. She is using progesterone and testosterone daily. Also using DHEA cream every 4 days.    Denies any UTI symptoms today.     Prior Pelvic surgery:   None     Prior treatment:   Pelvic floor therapy- at home, not formal    Fluid intake:   48 oz water    Pelvic floor symptom review:     Bladder:   Voids per day: 5-6 Voids per night: 2     Urinary incontinence episodes per day: Daily    Urge leakage:  Full Bladder   Stress  leakage: With Bending/Squatting   Continuous / insensible urine loss: No    Nocturnal enuresis: No    Leakage volume: Drops   Number of pads/day: 1    Bladder emptying: Complete   Voiding symptoms: Hesitancy and Strain-Push to Void   UTI in last 12 months: None   Other urologic history: None      Prolapse:     Prolapse symptoms: Bulge and Pelvic Pressure   Degree of prolapse: To Introitus   Duration of prolapse symptoms: 1.5 years      Bowel:    Constipation: No    Bowel movements per day: 1 , stool quality: soft log   Straining to empty bowels: No    Splinting to evacuate: No    Painful evacuation: No    Difficulty emptying rectum: No    Incontinence to stool: No    Blood in stool: No    Hemorrhoids: Yes   Bowel conditions: None   Most recent colonoscopy: , 5 year return      Sexual function:    Sexually active: Yes   Gender of partners: Male   Pain with intercourse: No   History of abuse: No        Pelvic Pain: None    Past medical and surgical history    Past obstetric history   Number of vaginal deliveries: 2   Number of  deliveries: 0   History of vacuum/forceps: No    History of obstetric anal sphincter injury: No     Past gynecological history:    Last menstrual period/Menopause: No LMP recorded. Patient is postmenopausal.   History of abnormal uterine bleeding: No    History of fibroids: No    History of endometrial polyps:  No    History of endometriosis: No    History of cervical dysplasia: No    Last pap: Last one more than 10 years ago   Current contraception: Post menopausal      Past medical history:  Past Medical History:   Diagnosis Date    Hypertension      Past surgical history:No past surgical history on file.  Medications:has a current medication list which includes the following prescription(s): estradiol, amlodipine, ascorbic acid, zinc acetate (oral), magnesium oxide, glucosamine-fish oil-epa-dha, and cholecalciferol.  Allergies:Hydrochlorothiazide and Propranolol  Family  history:  Family History   Problem Relation Age of Onset    Osteoporosis Mother     Cancer Father     Heart Disease Sister         a-fib    Heart Disease Brother      Social history: reports that she has never smoked. She has never used smokeless tobacco. She reports current alcohol use. She reports that she does not use drugs.    Review of systems: A full review of systems was performed, and negative with the exception of want is noted above in the HPI.        Objective        BP (!) 140/94 (BP Location: Left arm, Patient Position: Sitting, BP Cuff Size: Adult)   Pulse 81   Wt 134 lb 9.6 oz   BMI 21.08 kg/m²     Physical Exam  Constitutional:       Appearance: Normal appearance. She is normal weight.   HENT:      Head: Normocephalic.      Nose: Nose normal.   Eyes:      Pupils: Pupils are equal, round, and reactive to light.   Pulmonary:      Effort: Pulmonary effort is normal.   Abdominal:      Palpations: Abdomen is soft.   Musculoskeletal:         General: Normal range of motion.      Cervical back: Normal range of motion.   Skin:     General: Skin is warm.   Neurological:      General: No focal deficit present.      Mental Status: She is alert.   Psychiatric:         Mood and Affect: Mood normal.          Genitourinary:    Vulva: WNL   Urethra: WNL   Vagina: Atrophic   Atrophy: Mild      Chaperone was present throughout the physical exam.     Pelvic floor:    POP-Q:   Aa +1 Ba +1 C -5   GH 4 PB 1 TVL8   Ap +1 Bp +1 D -6     Procedure Performed: Pessary Fitting    Diagnostic test and records review:    Urine dipstick: n/a     Post-void residual: n/a    Labs:   Lab Results   Component Value Date/Time    SODIUM 132 (L) 03/14/2024 0738    POTASSIUM 4.5 03/14/2024 0738    CHLORIDE 99 03/14/2024 0738    CO2 24 03/14/2024 0738    GLUCOSE 86 03/14/2024 0738    BUN 14 03/14/2024 0738    CREATININE 0.54 03/14/2024 0738    CALCIUM 9.5 03/14/2024 0738    ANION 9.0 03/14/2024 0738       Lab Results   Component Value  Date/Time    WBC 5.3 03/14/2024 07:38 AM    RBC 4.77 03/14/2024 07:38 AM    HEMOGLOBIN 14.8 03/14/2024 07:38 AM    MCV 91.4 03/14/2024 07:38 AM    MCH 31.0 03/14/2024 07:38 AM    MCHC 33.9 03/14/2024 07:38 AM    RDW 43.9 03/14/2024 07:38 AM    MPV 9.1 03/14/2024 07:38 AM         Radiology:   05/10/24 Pelvic US:   - Ut: AV, 3.13 cm x 6.79 cm x 3.51 cm   - EMT: 1.6mm  - RO: normal size, small calcification  - LO: normal size   - Bladder low lying     Procedural: None    Documentation reviewed: Prior EMR Records    Outside records reviewed: 0 pages      Assessment & Plan     Beth Maddox is a 74 y.o. P2 with Stage 2 POP. We discussed my recommendations for further diagnosis and treatment at length today.     Assessment & Plan  Uterovaginal prolapse, incomplete  Rectocele  Cystocele  PALOMA  - Overall pessary was working well for prolapse but not addressing and potentially worsening her PALOMA. Recommend pessary refitting and pt agrees. Pt was refitted with a #4 ring with knob & support. See procedure note for details.   - FU in 1 month for pessary check. If doing well will push out next appt to 6 months as patient is self-managing.        Genitourinary syndrome of menopause  She has vaginal atrophy on examination. Reviewed risks, benefits, and indications for vaginal estrogen therapy.  Continue with vaginal estrogen therapy twice weekly.          Medical decision making (MDM)  40 minutes total time spent on the date of the encounter:    2 min reviewing records  20 min with the history and physical exam   10 min  spent in direct patient education and counseling   3 min spent in the coordination of care  5 min spent in electronic medical record documentation in the patient's chart.        Ailyn Duncan MD  Urogynecology and Reconstructive Pelvic Surgery  Department of Obstetrics and Gynecology  [facility]

## 2024-12-23 NOTE — PROGRESS NOTES
PT here today for pessary check  Pt states she cannot control urination when moving around or walking  Pt states she noticed her prolapse has improved  Good #: [phone]   Pharmacy Verified

## 2024-12-23 NOTE — ASSESSMENT & PLAN NOTE
Rectocele  Cystocele  PALOMA  - Overall pessary was working well for prolapse but not addressing and potentially worsening her PALOMA. Recommend pessary refitting and pt agrees. Pt was refitted with a #4 ring with knob & support. See procedure note for details.   - FU in 1 month for pessary check. If doing well will push out next appt to 6 months as patient is self-managing.

## 2024-12-24 PROCEDURE — 57160 INSERT PESSARY/OTHER DEVICE: CPT | Performed by: STUDENT IN AN ORGANIZED HEALTH CARE EDUCATION/TRAINING PROGRAM

## 2024-12-24 NOTE — PROCEDURES
Pessary Fitting    Date/Time: 12/24/2024 6:42 AM    Performed by: Ailyn Duncan M.D.  Authorized by: Ailyn Duncan M.D.    Consent:     Procedural risks discussed: yes      Relevant documents present and verified: yes      Patient agrees, verbalizes understanding, and wants to proceed: yes      Consent given by:  Patient  Indication:     Indication for pessary: uterine prolapse, cystocele, rectocele and incontinence      POP-Q:  Aa +1 Ba +1 C -5 GH 4 PB 1 TVL8 Ap +1 Bp +1 D -6  Procedure:     Pessaries tried:  #4 ring with knob & support    Pessary type:  Ring with knob    Pessary size:  4  Outcomes:     How did patient test pessary?:  Ambulate, cough, valsalva, squat    Patient tolerance of procedure:  Tolerated well, no immediate complications    Post-procedure education provided: yes      Pessary maintenance plan:  FU in 1 month    Reviewed by: provider    Comments:     Procedure comments:  Fitted with #4 ring with knob & support

## 2025-01-27 ENCOUNTER — TELEPHONE (OUTPATIENT)
Dept: OBGYN | Facility: CLINIC | Age: 75
End: 2025-01-27

## 2025-01-27 ENCOUNTER — TELEMEDICINE (OUTPATIENT)
Dept: GYNECOLOGY | Facility: CLINIC | Age: 75
End: 2025-01-27
Payer: MEDICARE

## 2025-01-27 DIAGNOSIS — N39.3 SUI (STRESS URINARY INCONTINENCE, FEMALE): ICD-10-CM

## 2025-01-27 DIAGNOSIS — N81.6 RECTOCELE: ICD-10-CM

## 2025-01-27 DIAGNOSIS — N81.2 UTEROVAGINAL PROLAPSE, INCOMPLETE: ICD-10-CM

## 2025-01-27 DIAGNOSIS — N81.10 CYSTOCELE, UNSPECIFIED: ICD-10-CM

## 2025-01-27 DIAGNOSIS — N95.8 GENITOURINARY SYNDROME OF MENOPAUSE: ICD-10-CM

## 2025-01-27 PROCEDURE — 99215 OFFICE O/P EST HI 40 MIN: CPT | Mod: 95 | Performed by: STUDENT IN AN ORGANIZED HEALTH CARE EDUCATION/TRAINING PROGRAM

## 2025-01-27 NOTE — TELEPHONE ENCOUNTER
Caller Name: Beth Maddox   Call Back Number: [phone]     How would the patient prefer to be contacted with a response: Phone call do NOT leave a detailed message    Called pt to see if she would like to have her virtual appointment at 3:00pm. Pts  said to keep the 3:45pm appointment as they may not be able to do a 3:00 virtual appointment, but will give us a call back if they can. Pt verbalized understanding of conversation.

## 2025-01-28 NOTE — PATIENT INSTRUCTIONS
UPDATE: For current bulking procedure (differs from pamphlet), this is performed in the OR under light anesthesia, and you go home the same day

## 2025-01-28 NOTE — PROGRESS NOTES
Urogynecology & Reconstructive Pelvic Surgery - Follow Up Visit    Beth Maddox MRN:[id number] :1950    Referred by: Dr. Edgardo Vega ([facility])    Reason for Visit:   Chief Complaint   Patient presents with    Follow-Up     Virtual     This evaluation was conducted via Teams using secure and encrypted videoconferencing technology. The patient was in their home in the [facility].    The patient's identity was confirmed and verbal consent was obtained for this virtual visit.      Subjective     History of Presenting Illness:  Beth Maddox is a 74 y.o. P2 with HTN, HLD and S2POP and PALOMA here for pessary check but mistakenly scheduled as a virtual visit and cannot come in today. She was fitted on 24 with a #4 ring with knob & support.     She still fees like she is leaking with walking, less than before but unpredictable and when it does happen still cannot control urine. Has checked her pessary position and it's in the correct place. Seems like it happens more in the afternoon but cannot determine any other exacerbating factors. From prolapse standpoint it is working well. Denies any pain or vaginal bleeding. Using vaginal estrogen twice a week without issue.     Has first PFPT appt on  with Caroline. Due to her schedule, only able to come in to clinic M and Fri this month.     Initial symptoms:  Reports feeling like her bladder is falling out, which is very uncomfortable. Ongoing for 1.5 years now and worsening. Initially occurred with exercise but now all the time. Did not have a repair as was stated in records.     Patient has been using support underwear for prolapse which has helped. She tried pelvic therapy at home but was not consistent with exercises.    Previously had issues with stress incontinence primarily due to constipation but now improving. The patient reports some leakage with certain movements (bending, squatting). She wears one pad a day. The patient has used  dietary modifications to help with her constipation.    Currently using Bi-Est on skin but not vaginally. She is using progesterone and testosterone daily. Also using DHEA cream every 4 days.    Denies any UTI symptoms today.     Prior Pelvic surgery:   None     Prior treatment:   Pelvic floor therapy- at home, not formal    Fluid intake:   48 oz water    Pelvic floor symptom review:     Bladder:   Voids per day: 5-6 Voids per night: 2     Urinary incontinence episodes per day: Daily    Urge leakage:  Full Bladder   Stress leakage: With Bending/Squatting   Continuous / insensible urine loss: No    Nocturnal enuresis: No    Leakage volume: Drops   Number of pads/day: 1    Bladder emptying: Complete   Voiding symptoms: Hesitancy and Strain-Push to Void   UTI in last 12 months: None   Other urologic history: None      Prolapse:     Prolapse symptoms: Bulge and Pelvic Pressure   Degree of prolapse: To Introitus   Duration of prolapse symptoms: 1.5 years      Bowel:    Constipation: No    Bowel movements per day: 1 , stool quality: soft log   Straining to empty bowels: No    Splinting to evacuate: No    Painful evacuation: No    Difficulty emptying rectum: No    Incontinence to stool: No    Blood in stool: No    Hemorrhoids: Yes   Bowel conditions: None   Most recent colonoscopy: , 5 year return      Sexual function:    Sexually active: Yes   Gender of partners: Male   Pain with intercourse: No   History of abuse: No        Pelvic Pain: None    Past medical and surgical history    Past obstetric history   Number of vaginal deliveries: 2   Number of  deliveries: 0   History of vacuum/forceps: No    History of obstetric anal sphincter injury: No     Past gynecological history:    Last menstrual period/Menopause: No LMP recorded. Patient is postmenopausal.   History of abnormal uterine bleeding: No    History of fibroids: No    History of endometrial polyps:  No    History of endometriosis: No    History of  cervical dysplasia: No    Last pap: Last one more than 10 years ago   Current contraception: Post menopausal      Past medical history:  Past Medical History:   Diagnosis Date    Hypertension      Past surgical history:No past surgical history on file.  Medications:has a current medication list which includes the following prescription(s): estradiol, amlodipine, ascorbic acid, zinc acetate (oral), magnesium oxide, glucosamine-fish oil-epa-dha, and cholecalciferol.  Allergies:Hydrochlorothiazide and Propranolol  Family history:  Family History   Problem Relation Age of Onset    Osteoporosis Mother     Cancer Father     Heart Disease Sister         a-fib    Heart Disease Brother      Social history: reports that she has never smoked. She has never used smokeless tobacco. She reports current alcohol use. She reports that she does not use drugs.    Review of systems: A full review of systems was performed, and negative with the exception of want is noted above in the HPI.        Objective        There were no vitals taken for this visit. - virtual visit     Physical Exam  Constitutional:       Appearance: Normal appearance. She is normal weight.   HENT:      Head: Normocephalic.      Nose: Nose normal.   Eyes:      Pupils: Pupils are equal, round, and reactive to light.   Pulmonary:      Effort: Pulmonary effort is normal.   Neurological:      Mental Status: She is alert.   Psychiatric:         Mood and Affect: Mood normal.        Pelvic deferred:  Genitourinary:    Vulva: WNL   Urethra: WNL   Vagina: Atrophic   Atrophy: Mild      Chaperone was present throughout the physical exam.     Pelvic floor:    POP-Q:   Aa +1 Ba +1 C -5   GH 4 PB 1 TVL8   Ap +1 Bp +1 D -6     Procedure Performed: Pessary Fitting    Diagnostic test and records review:    Urine dipstick: n/a     Post-void residual: n/a    Labs:   Lab Results   Component Value Date/Time    SODIUM 132 (L) 03/14/2024 0738    POTASSIUM 4.5 03/14/2024 0738    CHLORIDE  99 03/14/2024 0738    CO2 24 03/14/2024 0738    GLUCOSE 86 03/14/2024 0738    BUN 14 03/14/2024 0738    CREATININE 0.54 03/14/2024 0738    CALCIUM 9.5 03/14/2024 0738    ANION 9.0 03/14/2024 0738       Lab Results   Component Value Date/Time    WBC 5.3 03/14/2024 07:38 AM    RBC 4.77 03/14/2024 07:38 AM    HEMOGLOBIN 14.8 03/14/2024 07:38 AM    MCV 91.4 03/14/2024 07:38 AM    MCH 31.0 03/14/2024 07:38 AM    MCHC 33.9 03/14/2024 07:38 AM    RDW 43.9 03/14/2024 07:38 AM    MPV 9.1 03/14/2024 07:38 AM         Radiology:   05/10/24 Pelvic US:   - Ut: AV, 3.13 cm x 6.79 cm x 3.51 cm   - EMT: 1.6mm  - RO: normal size, small calcification  - LO: normal size   - Bladder low lying     Procedural: None    Documentation reviewed: Prior EMR Records    Outside records reviewed: 0 pages      Assessment & Plan     Beth Maddox is a 74 y.o. P2 with Stage 2 POP, GSM and PALOMA. We discussed my recommendations for further diagnosis and treatment at length today.     Assessment & Plan  Uterovaginal prolapse, incomplete  Cystocele  Rectocele  PALOMA  - Pessary is working well for prolapse without issue however only partially improving PALOMA.   - Discussed treatment options including pessary refitting, maintain current pessary and see if symptoms change once she starts PFPT or surgery.   We reviewed all surgical options including both vaginal and laparoscopic reconstructive approaches, and those using native tissue (non-mesh) and mesh augmented approaches.  We also discussed uterine-sparing approaches and those which involve hysterectomy. We discussed recurrent/retreatment risk for all surgical approaches.  Native tissue (nonmesh) approaches have a retreatment rate in excess of 20% long-term, and this is reduced with mesh augmentation (approximately 5-8%).  Current forms of surgical mesh have been extensively evaluated for their safety, but there is a 1 to 5% risk long-term of mesh complications, the majority of which include mesh  exposure, which was discussed with her.  A midurethral sling is the gold standard treatment for most stress urinary incontinence, especially when the urethra is hypermobile/unsupported.  This involves the placement of a safe, light weight piece of mesh under the urethra to help support it back into the normal anatomic location.  This is an outpatient vaginal surgery with same-day discharge and no need for narcotic pain medications.  While not the most painful procedure, there are restrictions on activity for 6 weeks afterwards including vigorous exercise, heavy lifting, straining, intercourse, sitting in water/swimming. Approximately 90% of patients experience of significant improvement in their leakage symptoms after sling procedure, and 60 to 70% report a complete resolution of her urinary leakage. Patients with urgency may also see improvement, but this may also persist or worsen due to different underlying causes of urinary urgency.  Approximately 20-30% of patients may require a temporary Raygoza catheter after this procedure, and 1/50 patients may need an adjustment to loosen the sling in the immediate postoperative period due to overtightening.  Mesh is considered overwhelmingly safe and has been extensively studied, but there is an approximately 1 to 3% chance long-term of a mesh complication, the majority of which are small mesh exposures which can be managed either through vaginal estrogen or excision of mesh.  Major complications are rare.   A urethral bulking procedure using Bulkamid is an alternative therapy for stress urinary incontinence. This does not use any permanent implanted materials, but employed as a safe hydrogel which allows ingrowth of the patient's own tissue to strengthen the opening of the bladder into the urethra. This is also a same-day procedure without any postoperative restrictions. While less invasive, success rates are lower when compared to the sling, with approximately 60% of  patients seeing a dramatic improvement in their symptoms, 90% of patients seeing some improvement. 1/4 patients require 2 treatments in order to get optimal therapeutic results. There is a 10 to 20% chance of needing a temporary Raygoza catheter for 1-2 days after the procedure.  At this time she would like to proceed with a pessary refitting and PFPT.   Will schedule for refitting on 1/31 with virtual FU on 2/11 prior to her PFPT appt on 2/12 to accommodate her schedule.   In addition to my verbal counseling today, detailed written counseling was provided. She was instructed to review these in detail prior to her preoperative visit and to bring questions.         Genitourinary syndrome of menopause  Continue vaginal estrogen twice weekly.        Medical decision making (MDM)  40 minutes total time spent on the date of the encounter:    2 min reviewing records  10 min with the history and physical exam   20 min  spent in direct patient education and counseling   3 min spent in the coordination of care  5 min spent in electronic medical record documentation in the patient's chart.        Ailyn Duncna MD  Urogynecology and Reconstructive Pelvic Surgery  Department of Obstetrics and Gynecology  [facility]  [facility]

## 2025-01-28 NOTE — ASSESSMENT & PLAN NOTE
Cystocele  Rectocele  PALOMA  - Pessary is working well for prolapse without issue however only partially improving PALOMA.   - Discussed treatment options including pessary refitting, maintain current pessary and see if symptoms change once she starts PFPT or surgery.   We reviewed all surgical options including both vaginal and laparoscopic reconstructive approaches, and those using native tissue (non-mesh) and mesh augmented approaches.  We also discussed uterine-sparing approaches and those which involve hysterectomy. We discussed recurrent/retreatment risk for all surgical approaches.  Native tissue (nonmesh) approaches have a retreatment rate in excess of 20% long-term, and this is reduced with mesh augmentation (approximately 5-8%).  Current forms of surgical mesh have been extensively evaluated for their safety, but there is a 1 to 5% risk long-term of mesh complications, the majority of which include mesh exposure, which was discussed with her.  A midurethral sling is the gold standard treatment for most stress urinary incontinence, especially when the urethra is hypermobile/unsupported.  This involves the placement of a safe, light weight piece of mesh under the urethra to help support it back into the normal anatomic location.  This is an outpatient vaginal surgery with same-day discharge and no need for narcotic pain medications.  While not the most painful procedure, there are restrictions on activity for 6 weeks afterwards including vigorous exercise, heavy lifting, straining, intercourse, sitting in water/swimming. Approximately 90% of patients experience of significant improvement in their leakage symptoms after sling procedure, and 60 to 70% report a complete resolution of her urinary leakage. Patients with urgency may also see improvement, but this may also persist or worsen due to different underlying causes of urinary urgency.  Approximately 20-30% of patients may require a temporary Raygoza catheter  after this procedure, and 1/50 patients may need an adjustment to loosen the sling in the immediate postoperative period due to overtightening.  Mesh is considered overwhelmingly safe and has been extensively studied, but there is an approximately 1 to 3% chance long-term of a mesh complication, the majority of which are small mesh exposures which can be managed either through vaginal estrogen or excision of mesh.  Major complications are rare.   A urethral bulking procedure using Bulkamid is an alternative therapy for stress urinary incontinence. This does not use any permanent implanted materials, but employed as a safe hydrogel which allows ingrowth of the patient's own tissue to strengthen the opening of the bladder into the urethra. This is also a same-day procedure without any postoperative restrictions. While less invasive, success rates are lower when compared to the sling, with approximately 60% of patients seeing a dramatic improvement in their symptoms, 90% of patients seeing some improvement. 1/4 patients require 2 treatments in order to get optimal therapeutic results. There is a 10 to 20% chance of needing a temporary Raygoza catheter for 1-2 days after the procedure.  At this time she would like to proceed with a pessary refitting and PFPT.   Will schedule for refitting on 1/31 with virtual FU on 2/11 prior to her PFPT appt on 2/12 to accommodate her schedule.   In addition to my verbal counseling today, detailed written counseling was provided. She was instructed to review these in detail prior to her preoperative visit and to bring questions.

## 2025-01-28 NOTE — TELEPHONE ENCOUNTER
Caller Name: Beth Maddox   Call Back Number: [phone]     How would the patient prefer to be contacted with a response: Phone call do NOT leave a detailed message    Called pt to get scheduled. Left a vm and call back number.

## 2025-01-31 ENCOUNTER — GYNECOLOGY VISIT (OUTPATIENT)
Dept: GYNECOLOGY | Facility: CLINIC | Age: 75
End: 2025-01-31
Payer: MEDICARE

## 2025-01-31 VITALS
HEIGHT: 67 IN | BODY MASS INDEX: 20.69 KG/M2 | WEIGHT: 131.8 LBS | DIASTOLIC BLOOD PRESSURE: 95 MMHG | SYSTOLIC BLOOD PRESSURE: 148 MMHG | HEART RATE: 67 BPM

## 2025-01-31 DIAGNOSIS — N81.10 CYSTOCELE, UNSPECIFIED: ICD-10-CM

## 2025-01-31 DIAGNOSIS — N81.2 UTEROVAGINAL PROLAPSE, INCOMPLETE: ICD-10-CM

## 2025-01-31 DIAGNOSIS — N39.3 SUI (STRESS URINARY INCONTINENCE, FEMALE): ICD-10-CM

## 2025-01-31 DIAGNOSIS — N81.6 RECTOCELE: ICD-10-CM

## 2025-01-31 ASSESSMENT — FIBROSIS 4 INDEX: FIB4 SCORE: 2

## 2025-01-31 NOTE — PROCEDURES
Pessary Fitting    Date/Time: 1/31/2025 6:46 AM    Performed by: Ailyn Duncan M.D.  Authorized by: Ailyn Duncan M.D.    Consent:     Procedural risks discussed: yes      Relevant documents present and verified: yes      Patient agrees, verbalizes understanding, and wants to proceed: yes      Consent given by:  Patient  Indication:     Indication for pessary: uterine prolapse, cystocele, rectocele and incontinence      POP-Q:  Aa +1Ba +1C -5 GH 4PB 1TVL8 Ap +1Bp +1D -6  Procedure:     Pessaries tried:  #5 ring with knob & support    Pessary type: Non-rubber silicone pessary.  Outcomes:     How did patient test pessary?:  Void, valsalva, walk, squat    Patient tolerance of procedure:  Tolerated well, no immediate complications    Post-procedure education provided: yes      Pessary maintenance plan:  FU in 1 week for pessarry check. Pt instucted to contact out clinic if pessary falls out, develops pain, vaginal bleeding or inability to void/have BM. Continue vaginal estrogen.    Reviewed by: provider    Comments:     Procedure comments:  In the past tried #4 incontinence dish, #4 ring with knob/support. Fitted with #5 ring with knob/support.           Ailyn Duncan MD  Female Pelvic Medicine and Reconstructive Surgery  Department of Obstetrics and Gynecology  [facility] [employer]  [facility]

## 2025-01-31 NOTE — PROGRESS NOTES
Pt is here today for pessary refitting  Pt states she is still experiencing incontinence  Phone #: [phone]   Pharm verified

## 2025-02-11 ENCOUNTER — TELEMEDICINE (OUTPATIENT)
Dept: GYNECOLOGY | Facility: CLINIC | Age: 75
End: 2025-02-11
Payer: MEDICARE

## 2025-02-11 DIAGNOSIS — N81.2 UTEROVAGINAL PROLAPSE, INCOMPLETE: ICD-10-CM

## 2025-02-11 DIAGNOSIS — N81.10 CYSTOCELE, UNSPECIFIED: ICD-10-CM

## 2025-02-11 DIAGNOSIS — N39.3 SUI (STRESS URINARY INCONTINENCE, FEMALE): ICD-10-CM

## 2025-02-11 DIAGNOSIS — N95.8 GENITOURINARY SYNDROME OF MENOPAUSE: ICD-10-CM

## 2025-02-11 DIAGNOSIS — N81.6 RECTOCELE: ICD-10-CM

## 2025-02-11 NOTE — PROGRESS NOTES
Urogynecology & Reconstructive Pelvic Surgery - Follow Up Visit    Beth Maddox [id number] :1950    Referred by: Dr. Edgardo Vega ([facility])    Reason for Visit: pessary check     This evaluation was conducted via Teams using secure and encrypted videoconferencing technology. The patient was in their home in the [facility].    The patient's identity was confirmed and verbal consent was obtained for this virtual visit.      Subjective     History of Presenting Illness:  Beth Maddox is a 74 y.o. P2 with HTN, HLD and S2POP and PALOMA here for pessary check but mistakenly scheduled as a virtual visit and cannot come in today. She was fitted on 25 with a #5 ring with knob & support.     Feels like leaking is better with this new pessary. Occurring less often and leaking less volume with walking and other activities. Emptying well without issue. No discomfort, pain or bleeding. Feels like this new pessary is making a difference. Still wears a thin liner, that gets soiled during the day but manageable. Using vaginal estrogen twice a week without issue.     Has first PFPT appt on  with [facility].     Initial symptoms:  Reports feeling like her bladder is falling out, which is very uncomfortable. Ongoing for 1.5 years now and worsening. Initially occurred with exercise but now all the time. Did not have a repair as was stated in records.     Patient has been using support underwear for prolapse which has helped. She tried pelvic therapy at home but was not consistent with exercises.    Previously had issues with stress incontinence primarily due to constipation but now improving. The patient reports some leakage with certain movements (bending, squatting). She wears one pad a day. The patient has used dietary modifications to help with her constipation.    Currently using Bi-Est on skin but not vaginally. She is using progesterone and testosterone daily. Also using DHEA cream every 4  days.    Denies any UTI symptoms today.     Prior Pelvic surgery:   None     Prior treatment:   Pelvic floor therapy- at home, not formal    Fluid intake:   48 oz water    Pelvic floor symptom review:     Bladder:   Voids per day: 5-6 Voids per night: 2     Urinary incontinence episodes per day: Daily    Urge leakage:  Full Bladder   Stress leakage: With Bending/Squatting   Continuous / insensible urine loss: No    Nocturnal enuresis: No    Leakage volume: Drops   Number of pads/day: 1    Bladder emptying: Complete   Voiding symptoms: Hesitancy and Strain-Push to Void   UTI in last 12 months: None   Other urologic history: None      Prolapse:     Prolapse symptoms: Bulge and Pelvic Pressure   Degree of prolapse: To Introitus   Duration of prolapse symptoms: 1.5 years      Bowel:    Constipation: No    Bowel movements per day: 1 , stool quality: soft log   Straining to empty bowels: No    Splinting to evacuate: No    Painful evacuation: No    Difficulty emptying rectum: No    Incontinence to stool: No    Blood in stool: No    Hemorrhoids: Yes   Bowel conditions: None   Most recent colonoscopy: , 5 year return      Sexual function:    Sexually active: Yes   Gender of partners: Male   Pain with intercourse: No   History of abuse: No        Pelvic Pain: None    Past medical and surgical history    Past obstetric history   Number of vaginal deliveries: 2   Number of  deliveries: 0   History of vacuum/forceps: No    History of obstetric anal sphincter injury: No     Past gynecological history:    Last menstrual period/Menopause: No LMP recorded. Patient is postmenopausal.   History of abnormal uterine bleeding: No    History of fibroids: No    History of endometrial polyps:  No    History of endometriosis: No    History of cervical dysplasia: No    Last pap: Last one more than 10 years ago   Current contraception: Post menopausal      Past medical history:  Past Medical History:   Diagnosis Date     Hypertension      Past surgical history:No past surgical history on file.  Medications:has a current medication list which includes the following prescription(s): estradiol, amlodipine, ascorbic acid, zinc acetate (oral), magnesium oxide, glucosamine-fish oil-epa-dha, and cholecalciferol.  Allergies:Hydrochlorothiazide and Propranolol  Family history:  Family History   Problem Relation Age of Onset    Osteoporosis Mother     Cancer Father     Heart Disease Sister         a-fib    Heart Disease Brother      Social history: reports that she has never smoked. She has never used smokeless tobacco. She reports current alcohol use. She reports that she does not use drugs.    Review of systems: A full review of systems was performed, and negative with the exception of want is noted above in the HPI.        Objective        There were no vitals taken for this visit. - virtual visit     Physical Exam  Constitutional:       Appearance: Normal appearance. She is normal weight.   HENT:      Head: Normocephalic.      Nose: Nose normal.   Eyes:      Pupils: Pupils are equal, round, and reactive to light.   Pulmonary:      Effort: Pulmonary effort is normal.   Neurological:      Mental Status: She is alert.   Psychiatric:         Mood and Affect: Mood normal.        Pelvic deferred:  Genitourinary:    Vulva: WNL   Urethra: WNL   Vagina: Atrophic   Atrophy: Mild      Chaperone was present throughout the physical exam.     Pelvic floor:    POP-Q:   Aa +1 Ba +1 C -5   GH 4 PB 1 TVL8   Ap +1 Bp +1 D -6     Procedure Performed: No    Diagnostic test and records review:    Urine dipstick: n/a     Post-void residual: n/a    Labs:   Lab Results   Component Value Date/Time    SODIUM 132 (L) 03/14/2024 0738    POTASSIUM 4.5 03/14/2024 0738    CHLORIDE 99 03/14/2024 0738    CO2 24 03/14/2024 0738    GLUCOSE 86 03/14/2024 0738    BUN 14 03/14/2024 0738    CREATININE 0.54 03/14/2024 0738    CALCIUM 9.5 03/14/2024 0738    ANION 9.0 03/14/2024  0738       Lab Results   Component Value Date/Time    WBC 5.3 03/14/2024 07:38 AM    RBC 4.77 03/14/2024 07:38 AM    HEMOGLOBIN 14.8 03/14/2024 07:38 AM    MCV 91.4 03/14/2024 07:38 AM    MCH 31.0 03/14/2024 07:38 AM    MCHC 33.9 03/14/2024 07:38 AM    RDW 43.9 03/14/2024 07:38 AM    MPV 9.1 03/14/2024 07:38 AM         Radiology:   05/10/24 Pelvic US:   - Ut: AV, 3.13 cm x 6.79 cm x 3.51 cm   - EMT: 1.6mm  - RO: normal size, small calcification  - LO: normal size   - Bladder low lying     Procedural: None    Documentation reviewed: Prior EMR Records    Outside records reviewed: 0 pages      Assessment & Plan     Beth Maddox is a 74 y.o. P2 with Stage 2 POP, GSM and PALOMA. We discussed my recommendations for further diagnosis and treatment at length today.     Assessment & Plan  Uterovaginal prolapse, incomplete  Cystocele  Rectocele  PALOMA  - Symptoms improved with #5 ring with knob/support. Still has some leaking but much less and manageable. Offered refitting to #6 but at this time she would like to continue with her current pessary.   - FU in 3 months for pessary check. Will consider #6 ring with support/knob at that time is leaking still bothersome.        Genitourinary syndrome of menopause  Continue vaginal estrogen twice weekly.        Medical decision making (MDM)  20 minutes total time spent on the date of the encounter:    1 min reviewing records  5 min with the history and physical exam   10 min  spent in direct patient education and counseling   1 min spent in the coordination of care  3 min spent in electronic medical record documentation in the patient's chart.        Ailyn Duncan MD  Urogynecology and Reconstructive Pelvic Surgery  Department of Obstetrics and Gynecology  [facility]

## 2025-02-11 NOTE — ASSESSMENT & PLAN NOTE
Cystocele  Rectocele  PALOMA  - Symptoms improved with #5 ring with knob/support. Still has some leaking but much less and manageable. Offered refitting to #6 but at this time she would like to continue with her current pessary.   - FU in 3 months for pessary check. Will consider #6 ring with support/knob at that time is leaking still bothersome.

## 2025-05-05 DIAGNOSIS — I10 PRIMARY HYPERTENSION: ICD-10-CM

## 2025-05-05 RX ORDER — AMLODIPINE BESYLATE 5 MG/1
5 TABLET ORAL DAILY
Qty: 90 TABLET | Refills: 3 | Status: SHIPPED | OUTPATIENT
Start: 2025-05-05

## 2025-06-27 ENCOUNTER — GYNECOLOGY VISIT (OUTPATIENT)
Dept: GYNECOLOGY | Facility: CLINIC | Age: 75
End: 2025-06-27
Payer: MEDICARE

## 2025-06-27 VITALS
WEIGHT: 129 LBS | BODY MASS INDEX: 20.25 KG/M2 | SYSTOLIC BLOOD PRESSURE: 129 MMHG | HEART RATE: 72 BPM | HEIGHT: 67 IN | DIASTOLIC BLOOD PRESSURE: 92 MMHG

## 2025-06-27 DIAGNOSIS — N95.8 GENITOURINARY SYNDROME OF MENOPAUSE: ICD-10-CM

## 2025-06-27 DIAGNOSIS — N81.2 UTEROVAGINAL PROLAPSE, INCOMPLETE: Primary | ICD-10-CM

## 2025-06-27 DIAGNOSIS — N81.6 RECTOCELE: ICD-10-CM

## 2025-06-27 DIAGNOSIS — N81.10 CYSTOCELE, UNSPECIFIED: ICD-10-CM

## 2025-06-27 DIAGNOSIS — N39.3 SUI (STRESS URINARY INCONTINENCE, FEMALE): ICD-10-CM

## 2025-06-27 ASSESSMENT — FIBROSIS 4 INDEX: FIB4 SCORE: 2

## 2025-06-27 NOTE — PROGRESS NOTES
[facility] signed and faxed back.  Kyara Orellana on 11/7/2019 at 9:35 AM     Pt here today for pessary check  Sx of UTI: No  Pharmacy verified with pt  Good # [phone]

## 2025-06-27 NOTE — PROGRESS NOTES
Urogynecology & Reconstructive Pelvic Surgery - Follow Up Visit    Beth Maddox MRN:[id number] :1950    Referred by: Dr. Edgardo Vega ([facility])    Reason for Visit: pessary check       Subjective     History of Presenting Illness:  Beth Maddox is a 74 y.o. P2 with HTN, HLD and S2POP and PALOMA here for pessary check. She was fitted on 25 with a #5 ring with knob & support.     Leaking is better with pessary but still present with activities despite doing PFME from PFPT. Sometimes she has no leakage, other times it's worse then gets better. Does not notice pessary is shifting. No issues with voiding/BM. No discomfort, pain or bleeding. Using vaginal estrogen twice a week without issue.     Initial symptoms:  Reports feeling like her bladder is falling out, which is very uncomfortable. Ongoing for 1.5 years now and worsening. Initially occurred with exercise but now all the time. Did not have a repair as was stated in records.     Patient has been using support underwear for prolapse which has helped. She tried pelvic therapy at home but was not consistent with exercises.    Previously had issues with stress incontinence primarily due to constipation but now improving. The patient reports some leakage with certain movements (bending, squatting). She wears one pad a day. The patient has used dietary modifications to help with her constipation.    Currently using Bi-Est on skin but not vaginally. She is using progesterone and testosterone daily. Also using DHEA cream every 4 days.    Denies any UTI symptoms today.     Prior Pelvic surgery:   None     Prior treatment:   Pelvic floor therapy- at home, not formal    Fluid intake:   48 oz water    Pelvic floor symptom review:     Bladder:   Voids per day: 5-6 Voids per night: 2     Urinary incontinence episodes per day: Daily    Urge leakage:  Full Bladder   Stress leakage: With Bending/Squatting   Continuous / insensible urine loss: No     Nocturnal enuresis: No    Leakage volume: Drops   Number of pads/day: 1    Bladder emptying: Complete   Voiding symptoms: Hesitancy and Strain-Push to Void   UTI in last 12 months: None   Other urologic history: None      Prolapse:     Prolapse symptoms: Bulge and Pelvic Pressure   Degree of prolapse: To Introitus   Duration of prolapse symptoms: 1.5 years      Bowel:    Constipation: No    Bowel movements per day: 1 , stool quality: soft log   Straining to empty bowels: No    Splinting to evacuate: No    Painful evacuation: No    Difficulty emptying rectum: No    Incontinence to stool: No    Blood in stool: No    Hemorrhoids: Yes   Bowel conditions: None   Most recent colonoscopy: , 5 year return      Sexual function:    Sexually active: Yes   Gender of partners: Male   Pain with intercourse: No   History of abuse: No        Pelvic Pain: None    Past medical and surgical history    Past obstetric history   Number of vaginal deliveries: 2   Number of  deliveries: 0   History of vacuum/forceps: No    History of obstetric anal sphincter injury: No     Past gynecological history:    Last menstrual period/Menopause: No LMP recorded. Patient is postmenopausal.   History of abnormal uterine bleeding: No    History of fibroids: No    History of endometrial polyps:  No    History of endometriosis: No    History of cervical dysplasia: No    Last pap: Last one more than 10 years ago   Current contraception: Post menopausal      Past medical history:  Past Medical History:   Diagnosis Date    Hypertension      Past surgical history:No past surgical history on file.  Medications:has a current medication list which includes the following prescription(s): amlodipine, estradiol, ascorbic acid, zinc acetate (oral), magnesium oxide, glucosamine-fish oil-epa-dha, and cholecalciferol.  Allergies:Hydrochlorothiazide and Propranolol  Family history:  Family History   Problem Relation Age of Onset    Osteoporosis Mother      Cancer Father     Heart Disease Sister         a-fib    Heart Disease Brother      Social history: reports that she has never smoked. She has never used smokeless tobacco. She reports current alcohol use. She reports that she does not use drugs.    Review of systems: A full review of systems was performed, and negative with the exception of want is noted above in the HPI.        Objective        There were no vitals taken for this visit. - virtual visit     Physical Exam  Constitutional:       Appearance: Normal appearance. She is normal weight.   HENT:      Head: Normocephalic.      Nose: Nose normal.   Eyes:      Pupils: Pupils are equal, round, and reactive to light.   Pulmonary:      Effort: Pulmonary effort is normal.   Neurological:      Mental Status: She is alert.   Psychiatric:         Mood and Affect: Mood normal.      Genitourinary:    Vulva: WNL   Urethra: WNL   Vagina: Atrophic   Atrophy: Mild      Chaperone was present throughout the physical exam.     Pelvic floor:    POP-Q:   Aa +1 Ba +1 C -5   GH 4 PB 1 TVL8   Ap +1 Bp +1 D -6     Procedure Performed: Pessary Check pessary removed, knob in correct position. Refitted with #6 ring with knob/support.     Diagnostic test and records review:    Urine dipstick: n/a     Post-void residual: n/a    Labs:   Lab Results   Component Value Date/Time    SODIUM 132 (L) 03/14/2024 0738    POTASSIUM 4.5 03/14/2024 0738    CHLORIDE 99 03/14/2024 0738    CO2 24 03/14/2024 0738    GLUCOSE 86 03/14/2024 0738    BUN 14 03/14/2024 0738    CREATININE 0.54 03/14/2024 0738    CALCIUM 9.5 03/14/2024 0738    ANION 9.0 03/14/2024 0738       Lab Results   Component Value Date/Time    WBC 5.3 03/14/2024 07:38 AM    RBC 4.77 03/14/2024 07:38 AM    HEMOGLOBIN 14.8 03/14/2024 07:38 AM    MCV 91.4 03/14/2024 07:38 AM    MCH 31.0 03/14/2024 07:38 AM    MCHC 33.9 03/14/2024 07:38 AM    RDW 43.9 03/14/2024 07:38 AM    MPV 9.1 03/14/2024 07:38 AM         Radiology:   05/10/24 Pelvic  US:   - Ut: AV, 3.13 cm x 6.79 cm x 3.51 cm   - EMT: 1.6mm  - RO: normal size, small calcification  - LO: normal size   - Bladder low lying     Procedural: None    Documentation reviewed: Prior EMR Records    Outside records reviewed: 0 pages      Assessment & Plan     Beth Maddox is a 74 y.o. P2 with Stage 2 POP, GSM and PALOMA. We discussed my recommendations for further diagnosis and treatment at length today.     Assessment & Plan  Uterovaginal prolapse, incomplete  Cystocele  Rectocele  - refitted with #6 ring with support/knob. String attached for patient to remove on her own.   - FU in 6 months        Genitourinary syndrome of menopause  Continue vaginal estrogen cream twice weekly.        Medical decision making (MDM)  25 minutes total time spent on the date of the encounter:    5 min reviewing records  5 min with the history and physical exam   10 min  spent in direct patient education and counseling   0 min spent in the coordination of care  5 min spent in electronic medical record documentation in the patient's chart.        Ailyn Duncan MD  Urogynecology and Reconstructive Pelvic Surgery  Department of Obstetrics and Gynecology  [facility] [employer]  [facility]

## 2025-06-27 NOTE — ASSESSMENT & PLAN NOTE
Cystocele  Rectocele  - refitted with #6 ring with support/knob. String attached for patient to remove on her own.   - FU in 6 months

## 2025-06-27 NOTE — PROCEDURES
Pessary Fitting    Date/Time: 6/27/2025 11:55 AM    Performed by: Ailyn Duncan M.D.  Authorized by: Ailyn Duncan M.D.    Consent:     Procedural risks discussed: yes      Relevant documents present and verified: yes      Patient agrees, verbalizes understanding, and wants to proceed: yes      Consent given by:  Patient  Indication:     Indication for pessary: uterine prolapse, cystocele, rectocele and incontinence      POP-Q:  Aa +1 Ba +1 C -5 GH 4 PB 1 TVL8 Ap +1 Bp +1 D -6  Procedure:     Pessaries tried:  #6 ring with support/knob    Pessary type:  Ring with knob (Non-rubber silicone pessary)    Pessary size:  6  Outcomes:     How did patient test pessary?:  Valsalva, walk, squat, removal/insertion    Patient tolerance of procedure:  Tolerated well, no immediate complications    Post-procedure education provided: yes      Pessary maintenance plan:  FU in 1 week for pessary check. Pt instucted to contact out clinic if pessary falls out, develops pain, vaginal bleeding or inability to void/have BM. Continue vaginal estrogen.    Reviewed by: provider    Comments:     Procedure comments:  Fitted with #6 ring with support/knob    Chaperone: Agatha Chris, Med Ass't